# Patient Record
Sex: MALE | Race: BLACK OR AFRICAN AMERICAN | NOT HISPANIC OR LATINO | Employment: STUDENT | URBAN - METROPOLITAN AREA
[De-identification: names, ages, dates, MRNs, and addresses within clinical notes are randomized per-mention and may not be internally consistent; named-entity substitution may affect disease eponyms.]

---
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
## 2025-01-06 ENCOUNTER — APPOINTMENT (OUTPATIENT)
Dept: HEMATOLOGY/ONCOLOGY | Facility: HOSPITAL | Age: 20
End: 2025-01-06
Payer: COMMERCIAL

## 2025-01-13 ASSESSMENT — ENCOUNTER SYMPTOMS
JOINT SWELLING: 0
FATIGUE: 0
FEVER: 0
SLEEP DISTURBANCE: 0
ARTHRALGIAS: 0
SORE THROAT: 0
VOMITING: 0
HEADACHES: 0
SHORTNESS OF BREATH: 0
ABDOMINAL PAIN: 0
DIZZINESS: 0
CHEST TIGHTNESS: 0
EYE PAIN: 0
DIFFICULTY URINATING: 0
LIGHT-HEADEDNESS: 0
APPETITE CHANGE: 0
ACTIVITY CHANGE: 0
EYE DISCHARGE: 0
BACK PAIN: 0
DIARRHEA: 0
DYSURIA: 0
NAUSEA: 0

## 2025-01-13 NOTE — PROGRESS NOTES
Patient ID: Lana Boss is a 18 y.o. male.  Referring Physician:   Primary Care Provider: NIKKI Dinero    Date of Service:  12/23/2024  VISIT TYPE:   Sickle Cell Follow Up  ___ Partial Manual Exchange Transfusion Visit        INTERVAL HISTORY:    Lana Boss is accompanied today by his mother. Since Lana Boss's last sickle cell follow up visit on 12/23/24:    ED: 0  Hospitalizations: 0  Illness: 0  Sickle Cell Pain: none  Concerns: none    HEALTH SURVEILLANCE STATUS:   [facility] - last seen on 8/9/24 - UTD  Dr. Murray - Upcoming on 1/15/25  Opthalmology - last seen on: 6/14/23, normal, scheduled 9/23/24, no show; Needs new appt scheduled  Dental - last seen 7/2023 - two teeth extracted,  had an appt for check-up/cleaning on 3/28/2024  which was missed. Needs new appt scheduled at [facility].  Cardiology - Echo on 1/15/25 normal- no PHTN   Last Cardiac MRI - 4/5/23, T2* value of myocardium is 28 ms suggesting no significant myocardial iron overload. UTD   Last liver MRI - 4/5/23, Hepatic MR signal consistent with iron infiltration. Hepatic iron deposition (LIC of  6 milligrams/gram). Mild to moderate in degree. UTD, Due in April 2025  Audiology - Seen on 8/7/24 - UTD  Neurosurgery - Saw Dr. Snow on 9/13/2023 for Headaches with valsalva maneuvers in the setting of Chiari I Malformation- MRI Brain done.  Thoracic, cervical and lumbar MRI, completed - need to set up FUP for review of scans. Last seen on 8/9/24; UTD  Opioid Agreement - no opioids prescribed  Pain Screen (> 8 yrs) - last completed on  8/17/23 - asymptomatic/low risk, has completed 4 screens, no additional screening needed  Immunizations due today:  UTD ( flu (/2024)  Labs due today: post transfusion HBG ID and cbc/diff/retic      MEDICATION ADHERENCE (missed doses within the last 2 weeks)  Amoxcillin - Missed 4-5 days  HU - Missed 4-5 days  Jadenu - Missed 4-5 days  Medication Refills Needed:  MVI and HU to be sent to [facility]    PMH: Lana had an episode of dactylitis in June 2006 and an episode of splenic sequestration in July 2006 and September 2006 requiring transfusion. He received chronic transfusions in 2006 for repeated splenic sequestration and they were discontinued in November 2006. He suffered a stroke in September 2008 and was begun on chronic transfusions at that time for secondary stroke prevention. He underwent splenectomy in January 2008. He has a history of reactive airway disease. 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  Saw Neurosurg on 1/11/17 for evaluation for Chiari malformation - Dr. Snow ordered MRI cervical, thoracic, and lumbar spine to evaluate for any evidence of syrinx during his most recent visit in September 2023. 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     The cerebellar tonsils terminate 7 mm below the level of the foramen magnum, essentially unchanged since the prior MRI. Again, the right cerebellar tonsil terminates slightly lower compared to the left. There is stable crowding at the foramen magnum due to the low lying cerebellar tonsils that is unachnged from previous MRI brain in 2017. The supratentorial ventricles and the 4th ventricle demonstrate no hydrocephalus and are unchanged in size, shape, and configuration including asymmetric mild prominence of the left lateral ventricle compared to the right. Stable regions of T2 hyperintensity within the bilateral cerebral hemispheric white matter, likely representing gliosis.  Lana is fully vaccinated against COVID 19     Surgical History:  Splenectomy In 2008     Social: Lives with mother and stepfather  Currently in the 12th grade at [employer]. Here for last visit with mom. He did not finish school and his completion date will be reset.     Patient reports he is no longer interested in attending [employer], but  would like to become an  or . See note by Katarzyna Lawson, [facility]    Review of Systems   Constitutional:  Negative for activity change, appetite change, fatigue and fever.   HENT:  Negative for congestion, dental problem, ear pain, sneezing and sore throat.    Eyes:  Negative for pain, discharge and visual disturbance.   Respiratory:  Negative for chest tightness and shortness of breath.    Cardiovascular:  Negative for chest pain and leg swelling.   Gastrointestinal:  Positive for constipation (BM every other day and firm at times. He is not taking anything for it). Negative for abdominal pain, diarrhea, nausea and vomiting.   Genitourinary:  Negative for difficulty urinating, dysuria and penile pain.   Musculoskeletal:  Negative for arthralgias, back pain and joint swelling.   Skin:  Negative for rash.   Allergic/Immunologic: Negative for food allergies.   Neurological:  Negative for dizziness, light-headedness and headaches.   Psychiatric/Behavioral:  Negative for sleep disturbance.         Objective:     Visit Vitals  Smoking Status Every Day        Physical Exam  Vitals and nursing note reviewed.   Constitutional:       General: He is not in acute distress.     Appearance: Normal appearance. He is normal weight. He is not ill-appearing.   HENT:      Head: Atraumatic.      Right Ear: External ear normal. There is no impacted cerumen.      Left Ear: External ear normal. There is no impacted cerumen.      Nose: No congestion or rhinorrhea.      Mouth/Throat:      Mouth: Mucous membranes are moist.      Pharynx: No oropharyngeal exudate or posterior oropharyngeal erythema.   Eyes:      General: Scleral icterus (moderate) present.      Pupils: Pupils are equal, round, and reactive to light.   Cardiovascular:      Rate and Rhythm: Normal rate.      Pulses: Normal pulses.      Heart sounds: Murmur heard.      Systolic murmur is present with a grade of 2/6.      No friction rub. No gallop.    Pulmonary:      Effort: Pulmonary effort is normal. No respiratory distress.      Breath sounds: Normal breath sounds. No stridor. No wheezing, rhonchi or rales.   Chest:      Chest wall: No tenderness.   Abdominal:      General: Bowel sounds are normal. There is no distension.      Palpations: Abdomen is soft. There is no mass.      Tenderness: There is no abdominal tenderness. There is no right CVA tenderness, left CVA tenderness, guarding or rebound.      Hernia: No hernia is present.   Musculoskeletal:         General: No swelling or tenderness. Normal range of motion.      Cervical back: Normal range of motion. No tenderness.   Skin:     General: Skin is warm.      Capillary Refill: Capillary refill takes less than 2 seconds.   Neurological:      General: No focal deficit present.      Mental Status: He is alert.   Psychiatric:      Comments: Baseline           LABORATORY:    Latest Reference Range & Units 01/17/25 15:29   ANTIBODY SCREEN  NEG   ABO TYPE  O   Rh Type  NEG   GLUCOSE 74 - 99 mg/dL 86   SODIUM 136 - 145 mmol/L 141   POTASSIUM 3.5 - 5.3 mmol/L 3.9   CHLORIDE 98 - 107 mmol/L 106   Bicarbonate 21 - 32 mmol/L 28   Anion Gap 10 - 20 mmol/L 11   Blood Urea Nitrogen 6 - 23 mg/dL 10   Creatinine 0.50 - 1.30 mg/dL 0.72   EGFR >60 mL/min/1.73m*2 >90   Calcium 8.6 - 10.6 mg/dL 9.3   Albumin 3.4 - 5.0 g/dL 4.1   Alkaline Phosphatase 33 - 120 U/L 80   ALT 10 - 52 U/L 37   AST 9 - 39 U/L 31   Bilirubin Total 0.0 - 1.2 mg/dL 12.8 (H)   FERRITIN 20 - 300 ng/mL 3,612 (H)   Total Protein 6.4 - 8.2 g/dL 6.8   LDH 84 - 246 U/L 204   WBC 4.4 - 11.3 x10*3/uL 13.9 (H)   nRBC 0.0 - 0.0 /100 WBCs 1.5 (H)   RBC 4.50 - 5.90 x10*6/uL 2.90 (L)   HEMOGLOBIN 13.5 - 17.5 g/dL 8.6 (L)   HEMATOCRIT 41.0 - 52.0 % 24.8 (L)   MCV 80 - 100 fL 86   MCH 26.0 - 34.0 pg 29.7   MCHC 32.0 - 36.0 g/dL 34.7   RED CELL DISTRIBUTION WIDTH 11.5 - 14.5 % 17.7 (H)   Platelets 150 - 450 x10*3/uL 519 (H)   Neutrophils % 40.0 - 80.0 % 53.0    Immature Granulocytes %, Automated 0.0 - 0.9 % 0.5   Lymphocytes % 13.0 - 44.0 % 32.6   Monocytes % 2.0 - 10.0 % 10.2   Eosinophils % 0.0 - 6.0 % 2.8   Basophils % 0.0 - 2.0 % 0.9   Neutrophils Absolute 1.20 - 7.70 x10*3/uL 7.33   Immature Granulocytes Absolute, Automated 0.00 - 0.70 x10*3/uL 0.07   Lymphocytes Absolute 1.20 - 4.80 x10*3/uL 4.52   Monocytes Absolute 0.10 - 1.00 x10*3/uL 1.41 (H)   Eosinophils Absolute 0.00 - 0.70 x10*3/uL 0.39   Basophils Absolute 0.00 - 0.10 x10*3/uL 0.13 (H)   Hemoglobin A  COMMENT ONLY (P)   Hemoglobin F  COMMENT ONLY (P)   Hemoglobin S <=0.0 % 34.5 (H) (P)   Hemoglobin A2  COMMENT ONLY (P)   Hemoglobin Identification Interpretation  COMMENT ONLY (P)   Pathologist Review-Hemoglobin Identification  COMMENT ONLY (P)   Immature Retic fraction <=16.0 % 29.1 (H)   Retic Absolute 0.022 - 0.118 x10*6/uL 0.398 (H)   Retic % 0.5 - 2.0 % 13.7 (H)   Reticulocyte Hemoglobin 28 - 38 pg 34   (H): Data is abnormally high  (L): Data is abnormally low  (P): Preliminary       Latest Reference Range & Units 01/17/25 15:29   WBC 4.4 - 11.3 x10*3/uL 13.9 (H)   nRBC 0.0 - 0.0 /100 WBCs 1.5 (H)   RBC 4.50 - 5.90 x10*6/uL 2.90 (L)   HEMOGLOBIN 13.5 - 17.5 g/dL 8.6 (L)   HEMATOCRIT 41.0 - 52.0 % 24.8 (L)   MCV 80 - 100 fL 86   MCH 26.0 - 34.0 pg 29.7   MCHC 32.0 - 36.0 g/dL 34.7   RED CELL DISTRIBUTION WIDTH 11.5 - 14.5 % 17.7 (H)   Platelets 150 - 450 x10*3/uL 519 (H)   Neutrophils % 40.0 - 80.0 % 53.0   Immature Granulocytes %, Automated 0.0 - 0.9 % 0.5   Lymphocytes % 13.0 - 44.0 % 32.6   Monocytes % 2.0 - 10.0 % 10.2   Eosinophils % 0.0 - 6.0 % 2.8   Basophils % 0.0 - 2.0 % 0.9   Neutrophils Absolute 1.20 - 7.70 x10*3/uL 7.33   Immature Granulocytes Absolute, Automated 0.00 - 0.70 x10*3/uL 0.07   Lymphocytes Absolute 1.20 - 4.80 x10*3/uL 4.52   Monocytes Absolute 0.10 - 1.00 x10*3/uL 1.41 (H)   Eosinophils Absolute 0.00 - 0.70 x10*3/uL 0.39   Basophils Absolute 0.00 - 0.10 x10*3/uL 0.13 (H)    Hemoglobin A  COMMENT ONLY (P)   Hemoglobin F  COMMENT ONLY (P)   Hemoglobin S <=0.0 % 34.5 (H) (P)   Hemoglobin A2  COMMENT ONLY (P)   Hemoglobin Identification Interpretation  COMMENT ONLY (P)   Pathologist Review-Hemoglobin Identification  COMMENT ONLY (P)   (H): Data is abnormally high  (L): Data is abnormally low  (P): Preliminary          Current Outpatient Medications   Medication Instructions    acetaminophen (TYLENOL) 650 mg, oral, Every 6 hours PRN    amoxicillin (AMOXIL) 250 mg, oral, Every 12 hours scheduled    deferasirox (JADENU) 1,440 mg, oral, Daily, Take 4 tablets (1440mg) by  mouth daily from Mondays to Fridays only. No medication on Saturdays and Sundays    hydroxyurea (HYDREA) 1,500 mg, oral, Daily, Take at the same time  from Monday to Fridays only. 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 Other problems include transfusional iron overload and Chiari malformation with a period where he had headaches. His most recent Echo is normal with no evidence of pulmonary hypertension. Medication adherence remains sub-optimal after altering his medication regimen.    Plan  Secondary stroke prevention  Pre transfusion hemoglobin and Hb S percent were  8.6g/dl and  34.5%. Prior to phlebotomy patient drank 300ml Gatorade for rehydration. No Normal saline was given. He tolerated phlebotomy well without dizziness.  Patient was phlebotomized 300ml, then transfused  600mls  prbc. He was premedicated with Tylenol 650mg. He tolerated the phlebotomy and transfusion without event. Transfusion interval is 4 weeks.    Post hemoglobin ID pending: See  epic for results     Transfusional iron overload  Current Jadenu dose is 1440 mg (4 tabs) 5 days a week M-F.  Lana to call himself for refill. Calling for refills has been continuously discussed with Lana  but met with rebuttals that his mother will schedule for him. Lana has been successful in scheduling appointments via [facility] but apprehensive to schedule if required to speak with someone. Encouraged Lana to call for his Jadenu refill with his mother in the room. Number provided.     Disease-modifying therapy  Continue Hydroxyurea 1500 mg 5 days a week (18mg/kg/week on average) M-F.  Refill sent today     Surgical asplenia  Continue Amoxicillin 250mg PO BID    Meds sent to pharmacy: Hydroxyurea, MVI sent to local pharmacy    Health Surveillance also discussed with Lana and he was able to recall with some prompting  Optho  Dental     Next Transfusion:  [facility] infusion center, date not scheduled yet (would be due Monday, 2/17)    BRYNN Roach, FNP-C

## 2025-01-15 ENCOUNTER — HOSPITAL ENCOUNTER (OUTPATIENT)
Dept: CARDIOLOGY | Facility: HOSPITAL | Age: 20
Discharge: HOME | End: 2025-01-15
Payer: COMMERCIAL

## 2025-01-15 ENCOUNTER — OFFICE VISIT (OUTPATIENT)
Dept: HEMATOLOGY/ONCOLOGY | Facility: HOSPITAL | Age: 20
End: 2025-01-15
Payer: COMMERCIAL

## 2025-01-15 VITALS
TEMPERATURE: 98.1 F | WEIGHT: 128.7 LBS | BODY MASS INDEX: 20.44 KG/M2 | RESPIRATION RATE: 16 BRPM | OXYGEN SATURATION: 99 % | SYSTOLIC BLOOD PRESSURE: 122 MMHG | DIASTOLIC BLOOD PRESSURE: 55 MMHG | HEART RATE: 70 BPM

## 2025-01-15 DIAGNOSIS — Z51.81 ENCOUNTER FOR MONITORING OF HYDROXYUREA THERAPY: ICD-10-CM

## 2025-01-15 DIAGNOSIS — Z92.89 HISTORY OF EXCHANGE TRANSFUSION: ICD-10-CM

## 2025-01-15 DIAGNOSIS — D57.1 SICKLE CELL DISEASE WITHOUT CRISIS (MULTI): ICD-10-CM

## 2025-01-15 DIAGNOSIS — D57.1 SICKLE CELL DISEASE WITHOUT CRISIS (MULTI): Primary | ICD-10-CM

## 2025-01-15 DIAGNOSIS — Z79.64 ENCOUNTER FOR MONITORING OF HYDROXYUREA THERAPY: ICD-10-CM

## 2025-01-15 PROCEDURE — 93306 TTE W/DOPPLER COMPLETE: CPT

## 2025-01-15 PROCEDURE — 93306 TTE W/DOPPLER COMPLETE: CPT | Performed by: INTERNAL MEDICINE

## 2025-01-15 PROCEDURE — 99215 OFFICE O/P EST HI 40 MIN: CPT | Performed by: PEDIATRICS

## 2025-01-15 ASSESSMENT — PAIN SCALES - GENERAL: PAINLEVEL_OUTOF10: 0-NO PAIN

## 2025-01-16 LAB
AORTIC VALVE MEAN GRADIENT: 5 MMHG
AORTIC VALVE PEAK VELOCITY: 1.64 M/S
AV PEAK GRADIENT: 11 MMHG
AVA (PEAK VEL): 3.12 CM2
AVA (VTI): 3.14 CM2
EJECTION FRACTION APICAL 4 CHAMBER: 64.5
EJECTION FRACTION: 62 %
LEFT VENTRICLE INTERNAL DIMENSION DIASTOLE: 4.91 CM (ref 3.5–6)
LEFT VENTRICULAR OUTFLOW TRACT DIAMETER: 2.11 CM
MITRAL VALVE E/A RATIO: 3.37
RIGHT VENTRICLE FREE WALL PEAK S': 14 CM/S
RIGHT VENTRICLE PEAK SYSTOLIC PRESSURE: 21.7 MMHG
TRICUSPID ANNULAR PLANE SYSTOLIC EXCURSION: 1.9 CM

## 2025-01-17 ENCOUNTER — HOSPITAL ENCOUNTER (OUTPATIENT)
Dept: PEDIATRIC HEMATOLOGY/ONCOLOGY | Facility: HOSPITAL | Age: 20
Discharge: HOME | End: 2025-01-17
Payer: COMMERCIAL

## 2025-01-17 DIAGNOSIS — D57.1 SICKLE CELL DISEASE WITHOUT CRISIS (MULTI): ICD-10-CM

## 2025-01-17 LAB
ABO GROUP (TYPE) IN BLOOD: NORMAL
ALBUMIN SERPL BCP-MCNC: 4.1 G/DL (ref 3.4–5)
ALP SERPL-CCNC: 80 U/L (ref 33–120)
ALT SERPL W P-5'-P-CCNC: 37 U/L (ref 10–52)
ANION GAP SERPL CALC-SCNC: 11 MMOL/L (ref 10–20)
ANTIBODY SCREEN: NORMAL
AST SERPL W P-5'-P-CCNC: 31 U/L (ref 9–39)
BASOPHILS # BLD AUTO: 0.13 X10*3/UL (ref 0–0.1)
BASOPHILS NFR BLD AUTO: 0.9 %
BILIRUB SERPL-MCNC: 12.8 MG/DL (ref 0–1.2)
BUN SERPL-MCNC: 10 MG/DL (ref 6–23)
CALCIUM SERPL-MCNC: 9.3 MG/DL (ref 8.6–10.6)
CHLORIDE SERPL-SCNC: 106 MMOL/L (ref 98–107)
CO2 SERPL-SCNC: 28 MMOL/L (ref 21–32)
CREAT SERPL-MCNC: 0.72 MG/DL (ref 0.5–1.3)
EGFRCR SERPLBLD CKD-EPI 2021: >90 ML/MIN/1.73M*2
EOSINOPHIL # BLD AUTO: 0.39 X10*3/UL (ref 0–0.7)
EOSINOPHIL NFR BLD AUTO: 2.8 %
ERYTHROCYTE [DISTWIDTH] IN BLOOD BY AUTOMATED COUNT: 17.7 % (ref 11.5–14.5)
FERRITIN SERPL-MCNC: 3612 NG/ML (ref 20–300)
GLUCOSE SERPL-MCNC: 86 MG/DL (ref 74–99)
HCT VFR BLD AUTO: 24.8 % (ref 41–52)
HGB BLD-MCNC: 8.6 G/DL (ref 13.5–17.5)
HGB RETIC QN: 34 PG (ref 28–38)
IMM GRANULOCYTES # BLD AUTO: 0.07 X10*3/UL (ref 0–0.7)
IMM GRANULOCYTES NFR BLD AUTO: 0.5 % (ref 0–0.9)
IMMATURE RETIC FRACTION: 29.1 %
LDH SERPL L TO P-CCNC: 204 U/L (ref 84–246)
LYMPHOCYTES # BLD AUTO: 4.52 X10*3/UL (ref 1.2–4.8)
LYMPHOCYTES NFR BLD AUTO: 32.6 %
MCH RBC QN AUTO: 29.7 PG (ref 26–34)
MCHC RBC AUTO-ENTMCNC: 34.7 G/DL (ref 32–36)
MCV RBC AUTO: 86 FL (ref 80–100)
MONOCYTES # BLD AUTO: 1.41 X10*3/UL (ref 0.1–1)
MONOCYTES NFR BLD AUTO: 10.2 %
NEUTROPHILS # BLD AUTO: 7.33 X10*3/UL (ref 1.2–7.7)
NEUTROPHILS NFR BLD AUTO: 53 %
NRBC BLD-RTO: 1.5 /100 WBCS (ref 0–0)
PLATELET # BLD AUTO: 519 X10*3/UL (ref 150–450)
POTASSIUM SERPL-SCNC: 3.9 MMOL/L (ref 3.5–5.3)
PROT SERPL-MCNC: 6.8 G/DL (ref 6.4–8.2)
RBC # BLD AUTO: 2.9 X10*6/UL (ref 4.5–5.9)
RETICS #: 0.4 X10*6/UL (ref 0.02–0.12)
RETICS/RBC NFR AUTO: 13.7 % (ref 0.5–2)
RH FACTOR (ANTIGEN D): NORMAL
SODIUM SERPL-SCNC: 141 MMOL/L (ref 136–145)
WBC # BLD AUTO: 13.9 X10*3/UL (ref 4.4–11.3)

## 2025-01-17 PROCEDURE — 84075 ASSAY ALKALINE PHOSPHATASE: CPT | Performed by: PEDIATRICS

## 2025-01-17 PROCEDURE — 36415 COLL VENOUS BLD VENIPUNCTURE: CPT

## 2025-01-17 PROCEDURE — 85025 COMPLETE CBC W/AUTO DIFF WBC: CPT | Performed by: PEDIATRICS

## 2025-01-17 PROCEDURE — 86901 BLOOD TYPING SEROLOGIC RH(D): CPT | Performed by: PEDIATRICS

## 2025-01-17 PROCEDURE — 85045 AUTOMATED RETICULOCYTE COUNT: CPT | Performed by: PEDIATRICS

## 2025-01-17 PROCEDURE — 86920 COMPATIBILITY TEST SPIN: CPT

## 2025-01-17 PROCEDURE — 36415 COLL VENOUS BLD VENIPUNCTURE: CPT | Performed by: PEDIATRICS

## 2025-01-17 PROCEDURE — 83615 LACTATE (LD) (LDH) ENZYME: CPT | Performed by: PEDIATRICS

## 2025-01-17 PROCEDURE — 82728 ASSAY OF FERRITIN: CPT | Performed by: PEDIATRICS

## 2025-01-17 NOTE — ADDENDUM NOTE
Encounter addended by: Ruthann Bhakta MA on: 1/17/2025 4:41 PM   Actions taken: Charge Capture section accepted

## 2025-01-20 ENCOUNTER — HOSPITAL ENCOUNTER (OUTPATIENT)
Dept: PEDIATRIC HEMATOLOGY/ONCOLOGY | Facility: HOSPITAL | Age: 20
Discharge: HOME | End: 2025-01-20
Payer: COMMERCIAL

## 2025-01-20 VITALS
OXYGEN SATURATION: 98 % | DIASTOLIC BLOOD PRESSURE: 69 MMHG | WEIGHT: 132.06 LBS | TEMPERATURE: 98.4 F | BODY MASS INDEX: 21.22 KG/M2 | SYSTOLIC BLOOD PRESSURE: 109 MMHG | RESPIRATION RATE: 18 BRPM | HEIGHT: 66 IN | HEART RATE: 65 BPM

## 2025-01-20 DIAGNOSIS — Z51.89 ENCOUNTER FOR BLOOD TRANSFUSION: ICD-10-CM

## 2025-01-20 DIAGNOSIS — Z51.81 ENCOUNTER FOR MONITORING OF HYDROXYUREA THERAPY: ICD-10-CM

## 2025-01-20 DIAGNOSIS — D57.1 SICKLE CELL DISEASE WITHOUT CRISIS (MULTI): ICD-10-CM

## 2025-01-20 DIAGNOSIS — G93.5 CHIARI MALFORMATION TYPE I (MULTI): ICD-10-CM

## 2025-01-20 DIAGNOSIS — Z79.64 ENCOUNTER FOR MONITORING OF HYDROXYUREA THERAPY: ICD-10-CM

## 2025-01-20 LAB
BASOPHILS # BLD AUTO: 0.08 X10*3/UL (ref 0–0.1)
BASOPHILS NFR BLD AUTO: 0.8 %
BLOOD EXPIRATION DATE: NORMAL
BLOOD EXPIRATION DATE: NORMAL
DISPENSE STATUS: NORMAL
DISPENSE STATUS: NORMAL
EOSINOPHIL # BLD AUTO: 0.28 X10*3/UL (ref 0–0.7)
EOSINOPHIL NFR BLD AUTO: 2.6 %
ERYTHROCYTE [DISTWIDTH] IN BLOOD BY AUTOMATED COUNT: 17.5 % (ref 11.5–14.5)
HCT VFR BLD AUTO: 25.6 % (ref 41–52)
HEMOGLOBIN A2: 2.5 % (ref 2–3.5)
HEMOGLOBIN A2: 2.7 % (ref 2–3.5)
HEMOGLOBIN A: 61.9 % (ref 95.8–98)
HEMOGLOBIN A: 67 % (ref 95.8–98)
HEMOGLOBIN F: 0.7 % (ref 0–2)
HEMOGLOBIN F: 0.9 % (ref 0–2)
HEMOGLOBIN IDENTIFICATION INTERPRETATION: ABNORMAL
HEMOGLOBIN IDENTIFICATION INTERPRETATION: ABNORMAL
HEMOGLOBIN S: 29.8 %
HEMOGLOBIN S: 34.5 %
HGB BLD-MCNC: 9.2 G/DL (ref 13.5–17.5)
IMM GRANULOCYTES # BLD AUTO: 0.05 X10*3/UL (ref 0–0.7)
IMM GRANULOCYTES NFR BLD AUTO: 0.5 % (ref 0–0.9)
LYMPHOCYTES # BLD AUTO: 4.09 X10*3/UL (ref 1.2–4.8)
LYMPHOCYTES NFR BLD AUTO: 38.4 %
MCH RBC QN AUTO: 30.1 PG (ref 26–34)
MCHC RBC AUTO-ENTMCNC: 35.9 G/DL (ref 32–36)
MCV RBC AUTO: 84 FL (ref 80–100)
MONOCYTES # BLD AUTO: 1.46 X10*3/UL (ref 0.1–1)
MONOCYTES NFR BLD AUTO: 13.7 %
NEUTROPHILS # BLD AUTO: 4.68 X10*3/UL (ref 1.2–7.7)
NEUTROPHILS NFR BLD AUTO: 44 %
NRBC BLD-RTO: 2.5 /100 WBCS (ref 0–0)
PATH REVIEW-HGB IDENTIFICATION: ABNORMAL
PATH REVIEW-HGB IDENTIFICATION: ABNORMAL
PLATELET # BLD AUTO: 306 X10*3/UL (ref 150–450)
PRODUCT BLOOD TYPE: 9500
PRODUCT BLOOD TYPE: 9500
PRODUCT CODE: NORMAL
PRODUCT CODE: NORMAL
RBC # BLD AUTO: 3.06 X10*6/UL (ref 4.5–5.9)
UNIT ABO: NORMAL
UNIT ABO: NORMAL
UNIT NUMBER: NORMAL
UNIT NUMBER: NORMAL
UNIT RH: NORMAL
UNIT RH: NORMAL
UNIT VOLUME: 350
UNIT VOLUME: 350
WBC # BLD AUTO: 10.6 X10*3/UL (ref 4.4–11.3)
XM INTEP: NORMAL
XM INTEP: NORMAL

## 2025-01-20 PROCEDURE — 83020 HEMOGLOBIN ELECTROPHORESIS: CPT | Performed by: NURSE PRACTITIONER

## 2025-01-20 PROCEDURE — 99195 PHLEBOTOMY: CPT

## 2025-01-20 PROCEDURE — 2500000001 HC RX 250 WO HCPCS SELF ADMINISTERED DRUGS (ALT 637 FOR MEDICARE OP): Mod: SE | Performed by: NURSE PRACTITIONER

## 2025-01-20 PROCEDURE — P9040 RBC LEUKOREDUCED IRRADIATED: HCPCS

## 2025-01-20 PROCEDURE — 85025 COMPLETE CBC W/AUTO DIFF WBC: CPT | Performed by: NURSE PRACTITIONER

## 2025-01-20 PROCEDURE — 36415 COLL VENOUS BLD VENIPUNCTURE: CPT | Performed by: NURSE PRACTITIONER

## 2025-01-20 PROCEDURE — 36430 TRANSFUSION BLD/BLD COMPNT: CPT

## 2025-01-20 PROCEDURE — 83021 HEMOGLOBIN CHROMOTOGRAPHY: CPT | Performed by: NURSE PRACTITIONER

## 2025-01-20 RX ORDER — LIDOCAINE 40 MG/G
CREAM TOPICAL ONCE AS NEEDED
OUTPATIENT
Start: 2025-02-14 | End: 2026-02-14

## 2025-01-20 RX ORDER — ACETAMINOPHEN 325 MG/1
650 TABLET ORAL ONCE
Status: COMPLETED | OUTPATIENT
Start: 2025-01-21 | End: 2025-01-20

## 2025-01-20 RX ORDER — ALBUTEROL SULFATE 0.83 MG/ML
2.5 SOLUTION RESPIRATORY (INHALATION) ONCE AS NEEDED
Status: CANCELLED | OUTPATIENT
Start: 2025-01-23

## 2025-01-20 RX ORDER — HYDROXYUREA 500 MG/1
1500 CAPSULE ORAL DAILY
Qty: 60 CAPSULE | Refills: 0 | Status: SHIPPED | OUTPATIENT
Start: 2025-01-20 | End: 2025-03-17 | Stop reason: SDUPTHER

## 2025-01-20 RX ORDER — EPINEPHRINE 1 MG/ML
0.5 INJECTION INTRAMUSCULAR; INTRAVENOUS; SUBCUTANEOUS ONCE AS NEEDED
Status: CANCELLED | OUTPATIENT
Start: 2025-01-24

## 2025-01-20 RX ORDER — ACETAMINOPHEN 325 MG/1
650 TABLET ORAL ONCE
Status: CANCELLED | OUTPATIENT
Start: 2025-01-24 | End: 2025-01-24

## 2025-01-20 RX ORDER — DIPHENHYDRAMINE HYDROCHLORIDE 50 MG/ML
50 INJECTION, SOLUTION INTRAMUSCULAR; INTRAVENOUS ONCE AS NEEDED
Status: CANCELLED | OUTPATIENT
Start: 2025-01-23 | End: 2026-01-23

## 2025-01-20 RX ORDER — MULTIVITAMIN WITH FOLIC ACID 400 MCG
1 TABLET ORAL DAILY
Qty: 30 TABLET | Refills: 5 | Status: SHIPPED | OUTPATIENT
Start: 2025-01-20

## 2025-01-20 RX ADMIN — ACETAMINOPHEN 650 MG: 325 TABLET ORAL at 10:09

## 2025-01-20 ASSESSMENT — ENCOUNTER SYMPTOMS
CONSTIPATION: 1
LOSS OF SENSATION IN FEET: 0
DEPRESSION: 0
OCCASIONAL FEELINGS OF UNSTEADINESS: 0

## 2025-01-20 ASSESSMENT — PAIN SCALES - GENERAL: PAINLEVEL_OUTOF10: 0-NO PAIN

## 2025-01-20 NOTE — PATIENT INSTRUCTIONS
"You can reach the [facility] Adult Sickle Cell Team by calling [phone].  Listen closely to the prompts.  First, press option 5 for \"medical questions or concerns\"; then press option 1 for \"sickle cell disease.\"     Please call for fever greater than 101 F,  pallor, lethargy, pain not responsive to home medications or any other questions or concerns.      MyChart:  Please send non-urgent messages only.  Messages are checked during regular business hours, Monday - Friday 8:30-4pm.  It may take up to 2 business days for our team to reply.  If you are sick, have a fever or have sickle cell pain, please call [phone], option 1, and then option 3 to speak to the Triage Nurse or On-call Physician immediately.     Sickle Cell Follow Up:  Your next sickle cell follow up will be in ~ 4 weeks.  Dr. Murray's office will reach out to you to set up the dates for your transfusion labs/doctor visit and your transfusion visit.    Refill sent today:  Vitamins and hydroxyurea have been sent to [facility].     Teaching done today:    "

## 2025-01-20 NOTE — PROGRESS NOTES
PEDIATRIC SICKLE CELL NURSING TREATMENT ASSESSMENT:    INTERVAL HISTORY - since last visit:  Source of information/relationship to patient:  [x] self [] other:   Since you last visit, how have you been doing? Good  Have you had any illnesses or fevers? [x] no [] yes:  Have you had any sick contacts with others?  If yes, please explain.  [x] no [] yes:   Have you had any visits to the Emergency Room?  [x] no [] yes:   Have you had any recent hospitalizations?  [x] no [] yes:  Do you have any concerns today?  [x] no [] yes:     Have you had any pain since your last visit?  [x] no [] yes  If yes, how many episodes?  ____  Where was the pain located?    How did you treat the pain?  Did the pain treatment help?  [] yes [] no:  How long did the pain last?  ____ days    REVIEW OF SYSTEMS:  GENERAL:    Abnl activity level [] (Y):  Fatigue/ Malaise [] (Y):    Unusual wt changes    [] (Y):    Abnl appetite          [] (Y):      School absences [] (Y):        Fevers/ Chills   [] (Y):  Pain     [] (Y):    Review of systems is negative except as noted by yes:  [x]    HEENT:   Glasses/contacts [] (Y):    Vision Changes  double or blurred  [] (Y):  Sore throat   [] (Y):    Sneezing/stuffy nose [] (Y):    Snoring  [x] (Y): Mom says he snores a little  Review of systems is negative except as noted by yes:  [x]    RESPIRATORY:  Cough         [] (Y):                       Congestion   [] (Y):      Dyspnea     [] (Y):   Review of systems is negative except as noted by yes:  [x]                                         CV:  Chest Pain   [] (Y):  Exercise Intol   [] (Y):    CVA issues  [] (Y):  Review of systems is negative except as noted by yes:  [x]     GI:  Nausea  [] (Y):  Vomiting   [] (Y):              Diarrhea  [] (Y):   Constipation    [] (Y):       Abdominal pain  [] (Y):  Pica   [] (Y):     Review of systems is negative except as noted by yes:  [x]                      :   Dysuria   [] (Y):  Enuresis  [] (Y):                                      Hematuria  [] (Y):    Priapism  [] (Y):                          LMP(date)  [] (Y):  Irregular cycles [] (Y):    Heavy Bleeding [] (Y):    Birth Control  [] (Y) Type:         Date last taken or given:   Review of systems is negative except as noted by yes:  [x]    ALLERGIC/IMMUNO:   Drug?  seasonal allergies [] (Y):  Review of systems is negative except as noted by yes:  [x]    HEME/LYMPH:   Enlarged spleen [] (Y):   Review of systems is negative except as noted by yes:  [x]            SKIN:    Rash:      [] (Y):   Review of systems is negative except as noted by yes:  [x]                                  MSC-SKL:   Joint stiffness or pain [] (Y):   Review of systems is negative except as noted by yes:  [x]                                  CNS:   Headache  [] (Y):    Dizziness  [] (Y):  Abnl gait  [] (Y):        Numbness/tingling  [] (Y):    Review of systems is negative except as noted by yes:  [x]    PSYCH:   Mood/behavior:  [] (Y):    Sleep    [] (Y):    Substance use  [] (Y):    School/work  [] (Y):  Difficulties  [] (Y):    Review of systems is negative except as noted by yes:  [x]    FOLLOW UP NEEDS:  Medication refills: MVI, HU  Referrals:   Testing appointments:

## 2025-01-20 NOTE — ADDENDUM NOTE
Encounter addended by: Nesha Frost RN on: 1/19/2025 7:38 PM   Actions taken: Charge Capture section accepted

## 2025-01-21 ENCOUNTER — APPOINTMENT (OUTPATIENT)
Dept: PEDIATRIC HEMATOLOGY/ONCOLOGY | Facility: HOSPITAL | Age: 20
End: 2025-01-21
Payer: COMMERCIAL

## 2025-01-21 PROCEDURE — 2500000001 HC RX 250 WO HCPCS SELF ADMINISTERED DRUGS (ALT 637 FOR MEDICARE OP): Mod: SE | Performed by: NURSE PRACTITIONER

## 2025-02-01 ASSESSMENT — ENCOUNTER SYMPTOMS
COUGH: 0
SCLERAL ICTERUS: 1
NUMBNESS: 0
HEMOPTYSIS: 0
DYSURIA: 0
CHEST TIGHTNESS: 0
LEG SWELLING: 0
DIZZINESS: 0
ARTHRALGIAS: 0
NERVOUS/ANXIOUS: 0
VOMITING: 0
SHORTNESS OF BREATH: 0
DIARRHEA: 0
BACK PAIN: 0
FLANK PAIN: 0
WHEEZING: 0
FEVER: 0
MYALGIAS: 0
FATIGUE: 0
SORE THROAT: 0
DEPRESSION: 0
FREQUENCY: 0
CONSTIPATION: 0
EYE PROBLEMS: 0
NAUSEA: 0
HEADACHES: 0
HEMATURIA: 0
LIGHT-HEADEDNESS: 0
EXTREMITY WEAKNESS: 0
WOUND: 0
BRUISES/BLEEDS EASILY: 0
ABDOMINAL PAIN: 0
PALPITATIONS: 0

## 2025-02-02 NOTE — PROGRESS NOTES
SICKLE CELL OUTPATIENT NOTE  Patient ID: Lana Boss   Visit Type: Follow up visit     ASSESSMENT AND PLAN  Lana Boss is 19 y.o. male with     History of Hb SS sickle cell disease without acute pain, or acute complications of sickle cell disease.  Infrequent sickle cell pain has been controlled with Tylenol or ibuprofen as needed and patient is opioid naïve.  Encounter for management of disease modifying therapy, with chronic red blood cell exchange transfusion indication being history of stroke (in 2018) and CNS vasculopathy  Encounter for management of disease modifying therapy with oral hydroxyurea. 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  Future Red cell exchange transfusion will be performed here at [facility].  Target hemoglobin S level posttransfusion will be less than 30%, and post transfusion hemoglobin of around 9 to 10 g/dL.  Discussed pre transfusion labs that needs to be done 24 to 48 hours prior to procedure with a type and screen, hemoglobin identification, ferritin, CBC, and a CMP.  -Continue hydroxyurea 1500 mg daily Monday to Friday.  -We will manage pain with Tylenol 650 mg every 6 hours as needed and or ibuprofen 600 mg every 8 hours as needed for mild to moderate pain.  If this does not control his pain he will call for oral morphine or oxycodone  -Discussed nonpharmacologic methods of pain control  -Patient does not have a record in [facility]  -Continue daily folic acid 1 mg  -Amoxicillin 250 mg twice a day for functional asplenia  -Continue Jadenu 144 0 mg daily  -Follow up will be in 4 weeks time for his next blood cell exchange  transfusion      Chief Complaint: Establish care for hemoglobin SS sickle cell disease     Interval History: Lana is present with his father for follow-up of hemoglobin SS sickle cell disease.  Electronic records were reviewed.  Lana and his father were not very good historians.    Lana is a 19 -year-old -American male with a history of hemoglobin SS sickle cell disease.  He received his care at [facility].  He is without any complaints in clinic today, and denies acute sickle cell pain, headaches, focal neurologic deficits, priapism, blurry vision, double vision, nausea, or vomiting.  He has not had any admissions to hospital or emergency department visits for well over 2 to 3 years now.  His appetite and energy levels have been good.  He denies swelling or leg ulcers.    Review of System:   Review of Systems   Constitutional:  Negative for fatigue and fever.   HENT:   Negative for nosebleeds, sore throat and tinnitus.    Eyes:  Positive for icterus. Negative for eye problems.   Respiratory:  Negative for chest tightness, cough, hemoptysis, shortness of breath and wheezing.    Cardiovascular:  Negative for chest pain, leg swelling and palpitations.   Gastrointestinal:  Negative for abdominal pain, constipation, diarrhea, nausea and vomiting.   Genitourinary:  Negative for dysuria, frequency and hematuria.    Musculoskeletal:  Negative for arthralgias, back pain, flank pain and myalgias.   Skin:  Negative for itching, rash and wound.   Neurological:  Negative for dizziness, extremity weakness, headaches, light-headedness and numbness.   Hematological:  Does not bruise/bleed easily.   Psychiatric/Behavioral:  Negative for depression. The patient is not nervous/anxious.         Allergies:   Allergies   Allergen Reactions    Kiwi Unknown    Red Blood Cells Unknown     Require medication for blood products- tylenol only       Current Medications:   Outpatient Encounter  Medications as of 1/15/2025   Medication Sig    acetaminophen (Tylenol) 325 mg tablet Take 2 tablets (650 mg) by mouth every 6 hours if needed for mild pain (1 - 3) (Note:  please check temperature 1st and do not take medicine if temperature is 101 F or greater - call sickle cell doctor.).    amoxicillin (Amoxil) 250 mg capsule Take 1 capsule (250 mg) by mouth every 12 hours.    deferasirox (Jadenu) 360 mg tablet Take 4 tablets (1,440 mg) by mouth once daily. Take 4 tablets (1440mg) by  mouth daily from Mondays to Fridays only. No medication on Saturdays and Sundays    ibuprofen (Motrin) capsule Take 2 capsules (400 mg) by mouth every 6 hours if needed (for pain or headache; check temperature 1st and do not take if fever 101 F or higher).    naproxen (Naprosyn) 375 mg tablet Do not give if temperature is at or above 101 degrees fahrenheit    hydroxyurea (Hydrea) 500 mg capsule Take 3 capsules (1,500 mg total) by mouth once daily.  Take at the same time  from Monday to Fridays only. No med on Saturdays and Sundays    Tab-A-Emre 400 mcg tablet Take 1 tablet by mouth once daily.     Past Medical/Surgical History:   - Dactylitis in June 2006   - Multiple Splenic sequestration in July 2006, September 2006, requiring transfusion   - Stroke in September 2008 and was begun on chronic transfusions at that time for secondary stroke prevention.  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 He does not have any smokeless tobacco history on file.  has no history on file for drug use. Lives with his parents. He is still in school  and in 12th grade. He also works at the [employer]       EXAMINATION FINDINGS   /55 (BP Location: Right arm, Patient Position: Sitting)   Pulse 70   Temp 36.7 °C (98.1 °F) (Temporal)   Resp 16   Wt 58.4 kg (128 lb 11.2 oz)   SpO2 99%   BMI 20.44 kg/m²   1.66 meters squared    Physical Exam  Vitals reviewed.   Constitutional:       General: He is not in acute distress.     Appearance: He is normal weight. He is not ill-appearing.   HENT:      Head: Normocephalic and atraumatic.      Right Ear: Tympanic membrane and ear canal normal.      Left Ear: Tympanic membrane and ear canal normal.      Nose: Nose normal.      Mouth/Throat:      Mouth: Mucous membranes are moist.      Pharynx: Oropharynx is clear. No oropharyngeal exudate or posterior oropharyngeal erythema.   Eyes:      General: Scleral icterus present.   Cardiovascular:      Rate and Rhythm: Normal rate and regular rhythm.      Pulses: Normal pulses.      Heart sounds: Murmur heard.      No gallop.   Pulmonary:      Effort: Pulmonary effort is normal. No respiratory distress.      Breath sounds: Normal breath sounds. No wheezing or rales.   Abdominal:      General: Bowel sounds are normal. There is no distension.      Palpations: Abdomen is soft.      Tenderness: There is no abdominal tenderness. There is no guarding.   Musculoskeletal:         General: No swelling or deformity. Normal range of motion.      Cervical back: Normal range of motion and neck supple. No rigidity.      Right lower leg: No edema.      Left lower leg: No edema.   Skin:     General: Skin is warm.      Capillary Refill: Capillary refill takes less than 2 seconds.      Coloration: Skin is not jaundiced.      Findings: No bruising.   Neurological:      General: No focal deficit present.      Mental Status: He is alert and oriented to person, place, and time. Mental status is at baseline.      Cranial Nerves: No cranial nerve deficit.   Psychiatric:         Mood and Affect:  Mood normal.         Behavior: Behavior normal.       LABS   Hospital Outpatient Visit on 01/15/2025   Component Date Value Ref Range Status    AV pk dario 01/15/2025 1.64  m/s Final    AV mn grad 01/15/2025 5  mmHg Final    LVOT diam 01/15/2025 2.11  cm Final    MV E/A ratio 01/15/2025 3.37   Final    Tricuspid annular plane systolic e* 01/15/2025 1.9  cm Final    LV EF 01/15/2025 62  % Final    RV free wall pk S' 01/15/2025 14.00  cm/s Final    LVIDd 01/15/2025 4.91  cm Final    RVSP 01/15/2025 21.7  mmHg Final    Aortic Valve Area by Continuity of* 01/15/2025 3.14  cm2 Final    Aortic Valve Area by Continuity of* 01/15/2025 3.12  cm2 Final    AV pk grad 01/15/2025 11  mmHg Final    LV A4C EF 01/15/2025 64.5   Final          Brett Murray MD

## 2025-02-03 DIAGNOSIS — D57.09 SICKLE CELL DISEASE WITH CRISIS AND OTHER COMPLICATION: ICD-10-CM

## 2025-02-03 DIAGNOSIS — Z51.89 ENCOUNTER FOR BLOOD TRANSFUSION: ICD-10-CM

## 2025-02-03 DIAGNOSIS — D57.00 SICKLE CELL DISEASE WITH CRISIS (MULTI): Primary | ICD-10-CM

## 2025-02-04 RX ORDER — EPINEPHRINE 0.3 MG/.3ML
0.3 INJECTION SUBCUTANEOUS EVERY 5 MIN PRN
Status: CANCELLED | OUTPATIENT
Start: 2025-02-17

## 2025-02-04 RX ORDER — DIPHENHYDRAMINE HCL 25 MG
25 CAPSULE ORAL ONCE
OUTPATIENT
Start: 2025-02-17

## 2025-02-04 RX ORDER — FAMOTIDINE 10 MG/ML
20 INJECTION INTRAVENOUS ONCE AS NEEDED
Status: CANCELLED | OUTPATIENT
Start: 2025-02-17

## 2025-02-04 RX ORDER — ACETAMINOPHEN 325 MG/1
650 TABLET ORAL ONCE
Status: CANCELLED | OUTPATIENT
Start: 2025-02-17

## 2025-02-04 RX ORDER — DIPHENHYDRAMINE HCL 25 MG
25 CAPSULE ORAL ONCE
Status: CANCELLED | OUTPATIENT
Start: 2025-02-17

## 2025-02-04 RX ORDER — ALBUTEROL SULFATE 0.83 MG/ML
3 SOLUTION RESPIRATORY (INHALATION) AS NEEDED
Status: CANCELLED | OUTPATIENT
Start: 2025-02-17

## 2025-02-04 RX ORDER — ACETAMINOPHEN 325 MG/1
650 TABLET ORAL ONCE
OUTPATIENT
Start: 2025-02-17

## 2025-02-04 RX ORDER — FAMOTIDINE 10 MG/ML
20 INJECTION INTRAVENOUS ONCE AS NEEDED
OUTPATIENT
Start: 2025-02-17

## 2025-02-04 RX ORDER — ALBUTEROL SULFATE 0.83 MG/ML
3 SOLUTION RESPIRATORY (INHALATION) AS NEEDED
OUTPATIENT
Start: 2025-02-17

## 2025-02-04 RX ORDER — DIPHENHYDRAMINE HYDROCHLORIDE 50 MG/ML
50 INJECTION INTRAMUSCULAR; INTRAVENOUS AS NEEDED
Status: CANCELLED | OUTPATIENT
Start: 2025-02-17

## 2025-02-04 RX ORDER — EPINEPHRINE 0.3 MG/.3ML
0.3 INJECTION SUBCUTANEOUS EVERY 5 MIN PRN
OUTPATIENT
Start: 2025-02-17

## 2025-02-04 RX ORDER — DIPHENHYDRAMINE HYDROCHLORIDE 50 MG/ML
50 INJECTION INTRAMUSCULAR; INTRAVENOUS AS NEEDED
OUTPATIENT
Start: 2025-02-17

## 2025-02-14 ENCOUNTER — TELEPHONE (OUTPATIENT)
Dept: HEMATOLOGY/ONCOLOGY | Facility: HOSPITAL | Age: 20
End: 2025-02-14
Payer: COMMERCIAL

## 2025-02-14 NOTE — TELEPHONE ENCOUNTER
Spoke with patients mother, Mara, regarding labs for upcoming appointment. Mara stated that she was unaware pt had to get them done today, thought she could get them done on Monday. Educated her on why we need them drawn prior to appointment day. States she is unable to bring Lana to the lab today, but will be able to bring him to [place] lab tomorrow. Aware hours are from 8-12.

## 2025-02-16 ENCOUNTER — TELEPHONE (OUTPATIENT)
Dept: HEMATOLOGY/ONCOLOGY | Facility: HOSPITAL | Age: 20
End: 2025-02-16
Payer: COMMERCIAL

## 2025-02-17 ENCOUNTER — APPOINTMENT (OUTPATIENT)
Dept: HEMATOLOGY/ONCOLOGY | Facility: HOSPITAL | Age: 20
End: 2025-02-17
Payer: COMMERCIAL

## 2025-02-17 ENCOUNTER — APPOINTMENT (OUTPATIENT)
Dept: PEDIATRIC HEMATOLOGY/ONCOLOGY | Facility: HOSPITAL | Age: 20
End: 2025-02-17
Payer: COMMERCIAL

## 2025-02-28 ENCOUNTER — TELEPHONE (OUTPATIENT)
Dept: HEMATOLOGY/ONCOLOGY | Facility: HOSPITAL | Age: 20
End: 2025-02-28
Payer: COMMERCIAL

## 2025-02-28 NOTE — TELEPHONE ENCOUNTER
Pt is scheduled for modified exchange on Mon 3/3. No type and screen done as of 2/28 1715. Attempted to call both patient numbers listed and mother's contact with no answer. If patient does not get labs done at [facility] tomorrow AM, he will need exchange rescheduled. Charge RN to follow up on Monday AM and team notified.

## 2025-03-03 ENCOUNTER — TELEPHONE (OUTPATIENT)
Dept: ADMISSION | Facility: HOSPITAL | Age: 20
End: 2025-03-03
Payer: COMMERCIAL

## 2025-03-03 ENCOUNTER — APPOINTMENT (OUTPATIENT)
Dept: HEMATOLOGY/ONCOLOGY | Facility: HOSPITAL | Age: 20
End: 2025-03-03
Payer: COMMERCIAL

## 2025-03-03 NOTE — TELEPHONE ENCOUNTER
"Pt's mother questioning why infusion was cancelled for today- she said this is the 2nd time infusion has been cancelled.   Pt was to be transitioning to the adult side of sickle cell team.   Pt's mother is very upset and feels she is \"getting the run a round\"     "

## 2025-03-04 ENCOUNTER — LAB (OUTPATIENT)
Dept: LAB | Facility: HOSPITAL | Age: 20
End: 2025-03-04
Payer: COMMERCIAL

## 2025-03-13 ENCOUNTER — TELEPHONE (OUTPATIENT)
Dept: HEMATOLOGY/ONCOLOGY | Facility: HOSPITAL | Age: 20
End: 2025-03-13
Payer: COMMERCIAL

## 2025-03-13 NOTE — TELEPHONE ENCOUNTER
Appointment reminder made to pt. No answer VM left. Pt to come to [facility] on Friday 3/14 for labs only. Follow up visit with Dr. Murray at 130 with Modified partial exchange @ 2pm.

## 2025-03-14 ENCOUNTER — LAB (OUTPATIENT)
Dept: LAB | Facility: HOSPITAL | Age: 20
End: 2025-03-14
Payer: COMMERCIAL

## 2025-03-14 DIAGNOSIS — D57.00 SICKLE CELL DISEASE WITH CRISIS (MULTI): ICD-10-CM

## 2025-03-14 DIAGNOSIS — D57.09 SICKLE CELL DISEASE WITH CRISIS AND OTHER COMPLICATION: ICD-10-CM

## 2025-03-14 DIAGNOSIS — Z51.89 ENCOUNTER FOR BLOOD TRANSFUSION: ICD-10-CM

## 2025-03-14 LAB
ABO GROUP (TYPE) IN BLOOD: NORMAL
ANTIBODY SCREEN: NORMAL
BASOPHILS # BLD AUTO: 0.07 X10*3/UL (ref 0–0.1)
BASOPHILS NFR BLD AUTO: 0.5 %
EOSINOPHIL # BLD AUTO: 0.45 X10*3/UL (ref 0–0.7)
EOSINOPHIL NFR BLD AUTO: 3.4 %
ERYTHROCYTE [DISTWIDTH] IN BLOOD BY AUTOMATED COUNT: 22.5 % (ref 11.5–14.5)
FERRITIN SERPL-MCNC: 4460 NG/ML (ref 20–300)
HCT VFR BLD AUTO: 23.6 % (ref 41–52)
HGB BLD-MCNC: 8.2 G/DL (ref 13.5–17.5)
HGB RETIC QN: 35 PG (ref 28–38)
HOWELL-JOLLY BOD BLD QL SMEAR: PRESENT
IMM GRANULOCYTES # BLD AUTO: 0.13 X10*3/UL (ref 0–0.7)
IMM GRANULOCYTES NFR BLD AUTO: 1 % (ref 0–0.9)
IMMATURE RETIC FRACTION: 35.9 %
LDH SERPL L TO P-CCNC: 316 U/L (ref 84–246)
LYMPHOCYTES # BLD AUTO: 3.98 X10*3/UL (ref 1.2–4.8)
LYMPHOCYTES NFR BLD AUTO: 30.3 %
MCH RBC QN AUTO: 30.9 PG (ref 26–34)
MCHC RBC AUTO-ENTMCNC: 34.7 G/DL (ref 32–36)
MCV RBC AUTO: 89 FL (ref 80–100)
MONOCYTES # BLD AUTO: 1.68 X10*3/UL (ref 0.1–1)
MONOCYTES NFR BLD AUTO: 12.8 %
NEUTROPHILS # BLD AUTO: 6.83 X10*3/UL (ref 1.2–7.7)
NEUTROPHILS NFR BLD AUTO: 52 %
NRBC BLD-RTO: 1.5 /100 WBCS (ref 0–0)
OVALOCYTES BLD QL SMEAR: NORMAL
PAPPENHEIMER BOD BLD QL SMEAR: PRESENT
PLATELET # BLD AUTO: 468 X10*3/UL (ref 150–450)
POLYCHROMASIA BLD QL SMEAR: NORMAL
RBC # BLD AUTO: 2.65 X10*6/UL (ref 4.5–5.9)
RBC MORPH BLD: NORMAL
RETICS #: 0.72 X10*6/UL (ref 0.02–0.12)
RETICS/RBC NFR AUTO: 27.4 % (ref 0.5–2)
RH FACTOR (ANTIGEN D): NORMAL
SCHISTOCYTES BLD QL SMEAR: NORMAL
SICKLE CELLS BLD QL SMEAR: NORMAL
TARGETS BLD QL SMEAR: NORMAL
WBC # BLD AUTO: 13.1 X10*3/UL (ref 4.4–11.3)

## 2025-03-14 PROCEDURE — 36415 COLL VENOUS BLD VENIPUNCTURE: CPT

## 2025-03-14 PROCEDURE — 85045 AUTOMATED RETICULOCYTE COUNT: CPT

## 2025-03-14 PROCEDURE — 83021 HEMOGLOBIN CHROMOTOGRAPHY: CPT

## 2025-03-14 PROCEDURE — 86920 COMPATIBILITY TEST SPIN: CPT

## 2025-03-14 PROCEDURE — 83615 LACTATE (LD) (LDH) ENZYME: CPT

## 2025-03-14 PROCEDURE — 86901 BLOOD TYPING SEROLOGIC RH(D): CPT

## 2025-03-14 PROCEDURE — 83020 HEMOGLOBIN ELECTROPHORESIS: CPT | Performed by: PATHOLOGY

## 2025-03-14 PROCEDURE — 82728 ASSAY OF FERRITIN: CPT

## 2025-03-14 PROCEDURE — 85025 COMPLETE CBC W/AUTO DIFF WBC: CPT

## 2025-03-16 RX ORDER — ALBUTEROL SULFATE 0.83 MG/ML
3 SOLUTION RESPIRATORY (INHALATION) AS NEEDED
Status: CANCELLED | OUTPATIENT
Start: 2025-04-11

## 2025-03-16 RX ORDER — FAMOTIDINE 10 MG/ML
20 INJECTION, SOLUTION INTRAVENOUS ONCE AS NEEDED
Status: CANCELLED | OUTPATIENT
Start: 2025-04-11

## 2025-03-16 RX ORDER — DIPHENHYDRAMINE HCL 25 MG
25 CAPSULE ORAL ONCE
Status: CANCELLED | OUTPATIENT
Start: 2025-04-11

## 2025-03-16 RX ORDER — DIPHENHYDRAMINE HYDROCHLORIDE 50 MG/ML
50 INJECTION, SOLUTION INTRAMUSCULAR; INTRAVENOUS AS NEEDED
Status: CANCELLED | OUTPATIENT
Start: 2025-04-11

## 2025-03-16 RX ORDER — EPINEPHRINE 0.3 MG/.3ML
0.3 INJECTION SUBCUTANEOUS EVERY 5 MIN PRN
Status: CANCELLED | OUTPATIENT
Start: 2025-04-11

## 2025-03-16 RX ORDER — ACETAMINOPHEN 325 MG/1
650 TABLET ORAL ONCE
Status: CANCELLED | OUTPATIENT
Start: 2025-04-11

## 2025-03-17 ENCOUNTER — INFUSION (OUTPATIENT)
Dept: HEMATOLOGY/ONCOLOGY | Facility: HOSPITAL | Age: 20
End: 2025-03-17
Payer: COMMERCIAL

## 2025-03-17 ENCOUNTER — OFFICE VISIT (OUTPATIENT)
Dept: HEMATOLOGY/ONCOLOGY | Facility: HOSPITAL | Age: 20
End: 2025-03-17
Payer: COMMERCIAL

## 2025-03-17 VITALS
OXYGEN SATURATION: 96 % | TEMPERATURE: 98.6 F | DIASTOLIC BLOOD PRESSURE: 78 MMHG | RESPIRATION RATE: 16 BRPM | HEART RATE: 95 BPM | SYSTOLIC BLOOD PRESSURE: 120 MMHG

## 2025-03-17 VITALS
RESPIRATION RATE: 16 BRPM | WEIGHT: 126.6 LBS | BODY MASS INDEX: 20.2 KG/M2 | SYSTOLIC BLOOD PRESSURE: 151 MMHG | HEART RATE: 105 BPM | OXYGEN SATURATION: 95 % | DIASTOLIC BLOOD PRESSURE: 72 MMHG | TEMPERATURE: 96.3 F

## 2025-03-17 DIAGNOSIS — D57.1 SICKLE CELL DISEASE WITHOUT CRISIS (MULTI): ICD-10-CM

## 2025-03-17 DIAGNOSIS — Z51.89 ENCOUNTER FOR BLOOD TRANSFUSION: ICD-10-CM

## 2025-03-17 DIAGNOSIS — Z51.81 ENCOUNTER FOR MONITORING OF HYDROXYUREA THERAPY: ICD-10-CM

## 2025-03-17 DIAGNOSIS — E83.19 IRON OVERLOAD: Primary | ICD-10-CM

## 2025-03-17 DIAGNOSIS — Z92.89 HISTORY OF EXCHANGE TRANSFUSION: ICD-10-CM

## 2025-03-17 DIAGNOSIS — D57.09 SICKLE CELL DISEASE WITH CRISIS AND OTHER COMPLICATION: ICD-10-CM

## 2025-03-17 DIAGNOSIS — Z79.64 ENCOUNTER FOR MONITORING OF HYDROXYUREA THERAPY: ICD-10-CM

## 2025-03-17 LAB
BASOPHILS # BLD AUTO: 0.13 X10*3/UL (ref 0–0.1)
BASOPHILS NFR BLD AUTO: 0.9 %
BLOOD EXPIRATION DATE: NORMAL
DISPENSE STATUS: NORMAL
EOSINOPHIL # BLD AUTO: 0.26 X10*3/UL (ref 0–0.7)
EOSINOPHIL NFR BLD AUTO: 1.7 %
ERYTHROCYTE [DISTWIDTH] IN BLOOD BY AUTOMATED COUNT: 18.4 % (ref 11.5–14.5)
HCT VFR BLD AUTO: 30.1 % (ref 41–52)
HGB BLD-MCNC: 10.4 G/DL (ref 13.5–17.5)
IMM GRANULOCYTES # BLD AUTO: 0.12 X10*3/UL (ref 0–0.7)
IMM GRANULOCYTES NFR BLD AUTO: 0.8 % (ref 0–0.9)
LYMPHOCYTES # BLD AUTO: 4.02 X10*3/UL (ref 1.2–4.8)
LYMPHOCYTES NFR BLD AUTO: 27 %
MCH RBC QN AUTO: 30.9 PG (ref 26–34)
MCHC RBC AUTO-ENTMCNC: 34.6 G/DL (ref 32–36)
MCV RBC AUTO: 89 FL (ref 80–100)
MONOCYTES # BLD AUTO: 2.11 X10*3/UL (ref 0.1–1)
MONOCYTES NFR BLD AUTO: 14.2 %
NEUTROPHILS # BLD AUTO: 8.27 X10*3/UL (ref 1.2–7.7)
NEUTROPHILS NFR BLD AUTO: 55.4 %
NRBC BLD-RTO: 6.2 /100 WBCS (ref 0–0)
PLATELET # BLD AUTO: 366 X10*3/UL (ref 150–450)
PRODUCT BLOOD TYPE: 9500
PRODUCT CODE: NORMAL
RBC # BLD AUTO: 3.37 X10*6/UL (ref 4.5–5.9)
UNIT ABO: NORMAL
UNIT NUMBER: NORMAL
UNIT RH: NORMAL
UNIT VOLUME: 274
UNIT VOLUME: 350
UNIT VOLUME: 350
WBC # BLD AUTO: 14.9 X10*3/UL (ref 4.4–11.3)
XM INTEP: NORMAL

## 2025-03-17 PROCEDURE — 99215 OFFICE O/P EST HI 40 MIN: CPT | Performed by: PEDIATRICS

## 2025-03-17 PROCEDURE — 99195 PHLEBOTOMY: CPT

## 2025-03-17 PROCEDURE — 2500000001 HC RX 250 WO HCPCS SELF ADMINISTERED DRUGS (ALT 637 FOR MEDICARE OP): Mod: SE | Performed by: PEDIATRICS

## 2025-03-17 PROCEDURE — 83021 HEMOGLOBIN CHROMOTOGRAPHY: CPT

## 2025-03-17 PROCEDURE — P9040 RBC LEUKOREDUCED IRRADIATED: HCPCS

## 2025-03-17 PROCEDURE — 85025 COMPLETE CBC W/AUTO DIFF WBC: CPT

## 2025-03-17 PROCEDURE — 36430 TRANSFUSION BLD/BLD COMPNT: CPT

## 2025-03-17 RX ORDER — HYDROXYUREA 500 MG/1
1500 CAPSULE ORAL DAILY
Qty: 60 CAPSULE | Refills: 0 | Status: SHIPPED | OUTPATIENT
Start: 2025-03-17

## 2025-03-17 RX ORDER — FAMOTIDINE 10 MG/ML
20 INJECTION, SOLUTION INTRAVENOUS ONCE AS NEEDED
OUTPATIENT
Start: 2025-04-11

## 2025-03-17 RX ORDER — DIPHENHYDRAMINE HCL 25 MG
25 CAPSULE ORAL ONCE
OUTPATIENT
Start: 2025-04-11

## 2025-03-17 RX ORDER — DIPHENHYDRAMINE HCL 25 MG
25 CAPSULE ORAL ONCE
Status: COMPLETED | OUTPATIENT
Start: 2025-03-17 | End: 2025-03-17

## 2025-03-17 RX ORDER — DEFERASIROX 360 MG/1
1440 TABLET, FILM COATED ORAL DAILY
Qty: 120 TABLET | Refills: 11 | Status: SHIPPED | OUTPATIENT
Start: 2025-03-17 | End: 2025-04-16

## 2025-03-17 RX ORDER — DIPHENHYDRAMINE HYDROCHLORIDE 50 MG/ML
50 INJECTION, SOLUTION INTRAMUSCULAR; INTRAVENOUS AS NEEDED
Status: DISCONTINUED | OUTPATIENT
Start: 2025-03-17 | End: 2025-03-17 | Stop reason: HOSPADM

## 2025-03-17 RX ORDER — FAMOTIDINE 10 MG/ML
20 INJECTION, SOLUTION INTRAVENOUS ONCE AS NEEDED
Status: DISCONTINUED | OUTPATIENT
Start: 2025-03-17 | End: 2025-03-17 | Stop reason: HOSPADM

## 2025-03-17 RX ORDER — EPINEPHRINE 0.3 MG/.3ML
0.3 INJECTION SUBCUTANEOUS EVERY 5 MIN PRN
Status: DISCONTINUED | OUTPATIENT
Start: 2025-03-17 | End: 2025-03-17 | Stop reason: HOSPADM

## 2025-03-17 RX ORDER — ALBUTEROL SULFATE 0.83 MG/ML
3 SOLUTION RESPIRATORY (INHALATION) AS NEEDED
OUTPATIENT
Start: 2025-04-11

## 2025-03-17 RX ORDER — DIPHENHYDRAMINE HYDROCHLORIDE 50 MG/ML
50 INJECTION, SOLUTION INTRAMUSCULAR; INTRAVENOUS AS NEEDED
OUTPATIENT
Start: 2025-04-11

## 2025-03-17 RX ORDER — ALBUTEROL SULFATE 0.83 MG/ML
3 SOLUTION RESPIRATORY (INHALATION) AS NEEDED
Status: DISCONTINUED | OUTPATIENT
Start: 2025-03-17 | End: 2025-03-17 | Stop reason: HOSPADM

## 2025-03-17 RX ORDER — EPINEPHRINE 0.3 MG/.3ML
0.3 INJECTION SUBCUTANEOUS EVERY 5 MIN PRN
OUTPATIENT
Start: 2025-04-11

## 2025-03-17 RX ORDER — ACETAMINOPHEN 325 MG/1
650 TABLET ORAL ONCE
Status: COMPLETED | OUTPATIENT
Start: 2025-03-17 | End: 2025-03-17

## 2025-03-17 RX ORDER — ACETAMINOPHEN 325 MG/1
650 TABLET ORAL ONCE
OUTPATIENT
Start: 2025-04-11

## 2025-03-17 RX ADMIN — DIPHENHYDRAMINE HYDROCHLORIDE 25 MG: 25 CAPSULE ORAL at 15:07

## 2025-03-17 RX ADMIN — ACETAMINOPHEN 650 MG: 325 TABLET ORAL at 15:07

## 2025-03-17 ASSESSMENT — PAIN SCALES - GENERAL: PAINLEVEL_OUTOF10: 0-NO PAIN

## 2025-03-17 NOTE — PROGRESS NOTES
Patient seen in infusion for partial exchange accompanied by mother, tolerated without complications. 500 ml of blood phlebotomized per order. 3 units of pRBCs given per order. Labs and schedule reviewed with patient. IV removed. Discharged ambulatory in stable condition.

## 2025-03-18 LAB
HEMOGLOBIN A2: 2.7 % (ref 2–3.5)
HEMOGLOBIN A2: 3 % (ref 2–3.5)
HEMOGLOBIN A: 32.7 % (ref 95.8–98)
HEMOGLOBIN A: 61.6 % (ref 95.8–98)
HEMOGLOBIN F: 0.8 % (ref 0–2)
HEMOGLOBIN F: 1 % (ref 0–2)
HEMOGLOBIN IDENTIFICATION INTERPRETATION: ABNORMAL
HEMOGLOBIN IDENTIFICATION INTERPRETATION: ABNORMAL
HEMOGLOBIN S: 34.9 %
HEMOGLOBIN S: 63.3 %
PATH REVIEW-HGB IDENTIFICATION: ABNORMAL
PATH REVIEW-HGB IDENTIFICATION: ABNORMAL

## 2025-03-20 ENCOUNTER — TELEPHONE (OUTPATIENT)
Dept: HEMATOLOGY/ONCOLOGY | Facility: HOSPITAL | Age: 20
End: 2025-03-20
Payer: COMMERCIAL

## 2025-03-20 NOTE — TELEPHONE ENCOUNTER
Zacarias from [facility] calls to clarify the prescription they received for Jadenu. Directions are to take 4 tabs once daily. Previous directions were to take 4 tabs once daily except for Saturday and Sunday. Pharmacy is requesting clarification.    Message sent to Sickle Cell team.

## 2025-04-04 ASSESSMENT — ENCOUNTER SYMPTOMS
DEPRESSION: 0
SHORTNESS OF BREATH: 0
WOUND: 0
SORE THROAT: 0
ARTHRALGIAS: 0
ABDOMINAL PAIN: 0
BRUISES/BLEEDS EASILY: 0
LIGHT-HEADEDNESS: 0
DYSURIA: 0
HEADACHES: 0
DIARRHEA: 0
BACK PAIN: 0
LEG SWELLING: 0
CONSTIPATION: 0
NERVOUS/ANXIOUS: 0
FLANK PAIN: 0
CHEST TIGHTNESS: 0
SCLERAL ICTERUS: 1
NUMBNESS: 0
DIZZINESS: 0
MYALGIAS: 0
VOMITING: 0
COUGH: 0
WHEEZING: 0
NAUSEA: 0
FREQUENCY: 0
HEMOPTYSIS: 0
EYE PROBLEMS: 0
HEMATURIA: 0
EXTREMITY WEAKNESS: 0
FEVER: 0
PALPITATIONS: 0
FATIGUE: 0

## 2025-04-04 NOTE — PROGRESS NOTES
SICKLE CELL OUTPATIENT NOTE  Patient ID: Lana Boss   Visit Type: Follow up visit     ASSESSMENT AND PLAN  Lana Boss is 19 y.o. male with     History of Hb SS sickle cell. He is in steady state and without acute complications of SCD including acute pain. 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  -Continue hydroxyurea 1500 mg daily Monday to Friday.  -No changes in his current home oral pain regimen with   Oral Tylenol 650 mg every 6 hours as needed and or ibuprofen 600 mg every 8 hours  or naprosyn 500 mg BID as needed for mild to moderate pain.    If above does not control his pain he will call for oral morphine or oxycodone. Of note, he is opioid naive and does not have any record in [facility]   Nonpharmacologic methods of pain control  -Daily folic acid 1 mg  -Amoxicillin 250 mg twice a day for functional asplenia  -Continue Jadenu 1440 mg daily and emphasized importance of medication compliance   -Follow up will be in 4 weeks time for his next blood cell exchange transfusion    Chief Complaint: HB SS SCD on chronic manual red cell transfusion      Interval History: Lana is present  with his mother in clinic today and they are without any complaints. He missed lasts months red cell exchange transfusion. He denies acute sickle cell pain in clinic today and has not had any since his last office visit encounter. He denies headaches, dizziness, focal neurologic deficits,  priapism, blurry vision, double vision, nausea, or vomiting.  He has not had any admissions to hospital or emergency department visits for the past 3 years. His appetite and energy levels have been good.  He denies swelling or leg ulcers. He is intermittently compliant with jadenu    Review of System:   Review of Systems   Constitutional:  Negative for fatigue and fever.   HENT:   Negative for nosebleeds, sore throat and tinnitus.    Eyes:  Positive for icterus. Negative for eye problems.   Respiratory:  Negative for chest tightness, cough, hemoptysis, shortness of breath and wheezing.    Cardiovascular:  Negative for chest pain, leg swelling and palpitations.   Gastrointestinal:  Negative for abdominal pain, constipation, diarrhea, nausea and vomiting.   Genitourinary:  Negative for dysuria, frequency and hematuria.    Musculoskeletal:  Negative for arthralgias, back pain, flank pain and myalgias.   Skin:  Negative for itching, rash and wound.   Neurological:  Negative for dizziness, extremity weakness, headaches, light-headedness and numbness.   Hematological:  Does not bruise/bleed easily.   Psychiatric/Behavioral:  Negative for depression. The patient is not nervous/anxious.       Allergies:   Allergies   Allergen Reactions    Kiwi Unknown    Red Blood Cells Unknown     Require medication for blood products- tylenol only     Current Medications:      Medication Sig    acetaminophen (Tylenol) 325 mg tablet Take 2 tablets (650 mg) by mouth every 6 hours if needed for mild pain (1 - 3) (Note:  please check temperature 1st and do not take medicine if temperature is 101 F or greater - call sickle cell doctor.).    amoxicillin (Amoxil) 250 mg capsule Take 1 capsule (250 mg) by mouth every 12 hours.    deferasirox (Jadenu) 360 mg tablet Take 4 tablets (1,440 mg) by mouth once daily. Take 4 tablets (1440mg) by  mouth daily from Mondays to Fridays only. No medication on Saturdays and Sundays    ibuprofen (Motrin) capsule  Take 2 capsules (400 mg) by mouth every 6 hours if needed (for pain or headache; check temperature 1st and do not take if fever 101 F or higher).    naproxen (Naprosyn) 375 mg tablet Do not give if temperature is at or above 101 degrees fahrenheit    hydroxyurea (Hydrea) 500 mg capsule Take 3 capsules (1,500 mg total) by mouth once daily.  Take at the same time  from Monday to Fridays only. No med on Saturdays and Sundays    Tab-A-Emre 400 mcg tablet Take 1 tablet by mouth once daily.     Past Medical/Surgical History:   - Dactylitis in June 2006   - Multiple Splenic sequestration in July 2006, September 2006, requiring transfusion   - Stroke in September 2008 and was begun on chronic transfusions at that time for secondary stroke prevention.  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 He does not have any smokeless tobacco history on file.  has no history on file for drug use. Lives with his parents. He graduated high school and looking for a job. He continues to work at the [employer]     EXAMINATION FINDINGS   /72 (BP Location: Right arm, Patient Position: Sitting, BP Cuff Size: Adult)   Pulse 105   Temp 35.7 °C (96.3 °F) (Skin)   Resp 16   Wt 57.4 kg (126 lb 9.6 oz)   SpO2 95%   BMI 20.20 kg/m²   1.64 meters squared    Physical Exam  Vitals reviewed.   Constitutional:       General: He is not in acute distress.     Appearance: He is normal weight. He is not ill-appearing.   HENT:      Head: Normocephalic and atraumatic.      Right Ear: Tympanic membrane and ear canal normal.      Left Ear:  Tympanic membrane and ear canal normal.      Nose: Nose normal.      Mouth/Throat:      Mouth: Mucous membranes are moist.      Pharynx: Oropharynx is clear. No oropharyngeal exudate or posterior oropharyngeal erythema.   Eyes:      General: Scleral icterus present.   Cardiovascular:      Rate and Rhythm: Normal rate and regular rhythm.      Pulses: Normal pulses.      Heart sounds: Murmur heard.      No gallop.   Pulmonary:      Effort: Pulmonary effort is normal. No respiratory distress.      Breath sounds: Normal breath sounds. No wheezing or rales.   Abdominal:      General: Bowel sounds are normal. There is no distension.      Palpations: Abdomen is soft.      Tenderness: There is no abdominal tenderness. There is no guarding.   Musculoskeletal:         General: No swelling or deformity. Normal range of motion.      Cervical back: Normal range of motion and neck supple. No rigidity.      Right lower leg: No edema.      Left lower leg: No edema.   Skin:     General: Skin is warm.      Capillary Refill: Capillary refill takes less than 2 seconds.      Coloration: Skin is not jaundiced.      Findings: No bruising.   Neurological:      General: No focal deficit present.      Mental Status: He is alert and oriented to person, place, and time. Mental status is at baseline.      Cranial Nerves: No cranial nerve deficit.   Psychiatric:         Mood and Affect: Mood normal.         Behavior: Behavior normal.       LABS   Infusion on 03/17/2025   Component Date Value Ref Range Status    PRODUCT CODE 03/16/2025 [id number]   Final    Unit Number 03/16/2025 [id number]   Final    Unit ABO 03/16/2025 O   Final    Unit RH 03/16/2025 NEG   Final    XM INTEP 03/16/2025 COMP   Final    Dispense Status 03/16/2025 TR   Final    Blood Expiration Date 03/16/2025 4/2/2025 11:59:00 PM EDT   Final    PRODUCT BLOOD TYPE 03/16/2025 9500   Final    UNIT VOLUME 03/16/2025 274   Final-Edited    PRODUCT CODE 03/16/2025 [id number]   Final     Unit Number 03/16/2025 [id number]   Final    Unit ABO 03/16/2025 O   Final    Unit RH 03/16/2025 NEG   Final    XM INTEP 03/16/2025 COMP   Final    Dispense Status 03/16/2025 TR   Final    Blood Expiration Date 03/16/2025 4/13/2025 11:59:00 PM EDT   Final    PRODUCT BLOOD TYPE 03/16/2025 9500   Final    UNIT VOLUME 03/16/2025 350   Final-Edited    PRODUCT CODE 03/17/2025 [id number]   Final    Unit Number 03/17/2025 [id number]   Final    Unit ABO 03/17/2025 O   Final    Unit RH 03/17/2025 NEG   Final    XM INTEP 03/17/2025 COMP   Final    Dispense Status 03/17/2025 TR   Final    Blood Expiration Date 03/17/2025 4/1/2025 11:59:00 PM EDT   Final    PRODUCT BLOOD TYPE 03/17/2025 9500   Final    UNIT VOLUME 03/17/2025 350   Final-Edited    WBC 03/17/2025 14.9 (H)  4.4 - 11.3 x10*3/uL Final    nRBC 03/17/2025 6.2 (H)  0.0 - 0.0 /100 WBCs Final    RBC 03/17/2025 3.37 (L)  4.50 - 5.90 x10*6/uL Final    Hemoglobin 03/17/2025 10.4 (L)  13.5 - 17.5 g/dL Final    Hematocrit 03/17/2025 30.1 (L)  41.0 - 52.0 % Final    MCV 03/17/2025 89  80 - 100 fL Final    MCH 03/17/2025 30.9  26.0 - 34.0 pg Final    MCHC 03/17/2025 34.6  32.0 - 36.0 g/dL Final    RDW 03/17/2025 18.4 (H)  11.5 - 14.5 % Final    Platelets 03/17/2025 366  150 - 450 x10*3/uL Final    Neutrophils % 03/17/2025 55.4  40.0 - 80.0 % Final    Immature Granulocytes %, Automated 03/17/2025 0.8  0.0 - 0.9 % Final    Immature Granulocyte Count (IG) includes promyelocytes, myelocytes and metamyelocytes but does not include bands. Percent differential counts (%) should be interpreted in the context of the absolute cell counts (cells/UL).    Lymphocytes % 03/17/2025 27.0  13.0 - 44.0 % Final    Monocytes % 03/17/2025 14.2  2.0 - 10.0 % Final    Eosinophils % 03/17/2025 1.7  0.0 - 6.0 % Final    Basophils % 03/17/2025 0.9  0.0 - 2.0 % Final    Neutrophils Absolute 03/17/2025 8.27 (H)  1.20 - 7.70 x10*3/uL Final    Percent differential counts (%) should be interpreted in  the context of the absolute cell counts (cells/uL).    Immature Granulocytes Absolute, Au* 03/17/2025 0.12  0.00 - 0.70 x10*3/uL Final    Lymphocytes Absolute 03/17/2025 4.02  1.20 - 4.80 x10*3/uL Final    Monocytes Absolute 03/17/2025 2.11 (H)  0.10 - 1.00 x10*3/uL Final    Eosinophils Absolute 03/17/2025 0.26  0.00 - 0.70 x10*3/uL Final    Basophils Absolute 03/17/2025 0.13 (H)  0.00 - 0.10 x10*3/uL Final    Hemoglobin A 03/17/2025 61.6 (L)  95.8 - 98.0 % Final    Hemoglobin F 03/17/2025 0.8  0.0 - 2.0 % Final    Hemoglobin S 03/17/2025 34.9 (H)  <=0.0 % Final    Hemoglobin A2 03/17/2025 2.7  2.0 - 3.5 % Final    Hemoglobin Identification Interpre* 03/17/2025 See Below (A)  Normal Final    Known Sickle Cell Anemia post-transfusion          Brett Murray MD

## 2025-04-09 ENCOUNTER — TELEPHONE (OUTPATIENT)
Dept: HEMATOLOGY/ONCOLOGY | Facility: HOSPITAL | Age: 20
End: 2025-04-09

## 2025-04-11 ENCOUNTER — LAB (OUTPATIENT)
Dept: LAB | Facility: HOSPITAL | Age: 20
End: 2025-04-11
Payer: COMMERCIAL

## 2025-04-11 DIAGNOSIS — D57.09 SICKLE CELL DISEASE WITH CRISIS AND OTHER COMPLICATION: ICD-10-CM

## 2025-04-11 DIAGNOSIS — Z51.89 ENCOUNTER FOR BLOOD TRANSFUSION: ICD-10-CM

## 2025-04-11 LAB
ABO GROUP (TYPE) IN BLOOD: NORMAL
ANTIBODY SCREEN: NORMAL
BASOPHILS # BLD AUTO: 0.11 X10*3/UL (ref 0–0.1)
BASOPHILS NFR BLD AUTO: 0.5 %
BURR CELLS BLD QL SMEAR: NORMAL
EOSINOPHIL # BLD AUTO: 0.45 X10*3/UL (ref 0–0.7)
EOSINOPHIL NFR BLD AUTO: 1.9 %
ERYTHROCYTE [DISTWIDTH] IN BLOOD BY AUTOMATED COUNT: 18.6 % (ref 11.5–14.5)
GIANT PLATELETS BLD QL SMEAR: NORMAL
HCT VFR BLD AUTO: 26.8 % (ref 41–52)
HGB BLD-MCNC: 9.1 G/DL (ref 13.5–17.5)
HOWELL-JOLLY BOD BLD QL SMEAR: PRESENT
IMM GRANULOCYTES # BLD AUTO: 0.16 X10*3/UL (ref 0–0.7)
IMM GRANULOCYTES NFR BLD AUTO: 0.7 % (ref 0–0.9)
LYMPHOCYTES # BLD AUTO: 2.69 X10*3/UL (ref 1.2–4.8)
LYMPHOCYTES NFR BLD AUTO: 11.2 %
MCH RBC QN AUTO: 31 PG (ref 26–34)
MCHC RBC AUTO-ENTMCNC: 34 G/DL (ref 32–36)
MCV RBC AUTO: 91 FL (ref 80–100)
MONOCYTES # BLD AUTO: 2.35 X10*3/UL (ref 0.1–1)
MONOCYTES NFR BLD AUTO: 9.8 %
NEUTROPHILS # BLD AUTO: 18.18 X10*3/UL (ref 1.2–7.7)
NEUTROPHILS NFR BLD AUTO: 75.9 %
NRBC BLD-RTO: 1.2 /100 WBCS (ref 0–0)
PAPPENHEIMER BOD BLD QL SMEAR: PRESENT
PLATELET # BLD AUTO: 519 X10*3/UL (ref 150–450)
POLYCHROMASIA BLD QL SMEAR: NORMAL
RBC # BLD AUTO: 2.94 X10*6/UL (ref 4.5–5.9)
RBC MORPH BLD: NORMAL
RH FACTOR (ANTIGEN D): NORMAL
SCHISTOCYTES BLD QL SMEAR: NORMAL
SICKLE CELLS BLD QL SMEAR: NORMAL
TARGETS BLD QL SMEAR: NORMAL
WBC # BLD AUTO: 23.9 X10*3/UL (ref 4.4–11.3)

## 2025-04-11 PROCEDURE — 86920 COMPATIBILITY TEST SPIN: CPT

## 2025-04-11 PROCEDURE — 83021 HEMOGLOBIN CHROMOTOGRAPHY: CPT

## 2025-04-11 PROCEDURE — 36415 COLL VENOUS BLD VENIPUNCTURE: CPT

## 2025-04-11 PROCEDURE — 86900 BLOOD TYPING SEROLOGIC ABO: CPT

## 2025-04-11 PROCEDURE — 85025 COMPLETE CBC W/AUTO DIFF WBC: CPT

## 2025-04-11 PROCEDURE — 83020 HEMOGLOBIN ELECTROPHORESIS: CPT | Performed by: PATHOLOGY

## 2025-04-13 RX ORDER — FAMOTIDINE 10 MG/ML
20 INJECTION, SOLUTION INTRAVENOUS ONCE AS NEEDED
Status: CANCELLED | OUTPATIENT
Start: 2025-04-14

## 2025-04-13 RX ORDER — ACETAMINOPHEN 325 MG/1
650 TABLET ORAL ONCE
Status: CANCELLED | OUTPATIENT
Start: 2025-04-14

## 2025-04-13 RX ORDER — EPINEPHRINE 0.3 MG/.3ML
0.3 INJECTION SUBCUTANEOUS EVERY 5 MIN PRN
Status: CANCELLED | OUTPATIENT
Start: 2025-04-14

## 2025-04-13 RX ORDER — DIPHENHYDRAMINE HYDROCHLORIDE 50 MG/ML
50 INJECTION, SOLUTION INTRAMUSCULAR; INTRAVENOUS AS NEEDED
Status: CANCELLED | OUTPATIENT
Start: 2025-04-14

## 2025-04-13 RX ORDER — DIPHENHYDRAMINE HCL 25 MG
25 CAPSULE ORAL ONCE
Status: CANCELLED | OUTPATIENT
Start: 2025-04-14

## 2025-04-13 RX ORDER — ALBUTEROL SULFATE 0.83 MG/ML
3 SOLUTION RESPIRATORY (INHALATION) AS NEEDED
Status: CANCELLED | OUTPATIENT
Start: 2025-04-14

## 2025-04-14 ENCOUNTER — INFUSION (OUTPATIENT)
Dept: HEMATOLOGY/ONCOLOGY | Facility: HOSPITAL | Age: 20
End: 2025-04-14
Payer: COMMERCIAL

## 2025-04-14 VITALS
DIASTOLIC BLOOD PRESSURE: 70 MMHG | TEMPERATURE: 98.4 F | RESPIRATION RATE: 18 BRPM | WEIGHT: 127 LBS | BODY MASS INDEX: 20.27 KG/M2 | SYSTOLIC BLOOD PRESSURE: 106 MMHG | HEART RATE: 79 BPM | OXYGEN SATURATION: 99 %

## 2025-04-14 DIAGNOSIS — G93.5 CHIARI MALFORMATION TYPE I (MULTI): ICD-10-CM

## 2025-04-14 DIAGNOSIS — D57.09 SICKLE CELL DISEASE WITH CRISIS AND OTHER COMPLICATION: ICD-10-CM

## 2025-04-14 DIAGNOSIS — Z51.89 ENCOUNTER FOR BLOOD TRANSFUSION: Primary | ICD-10-CM

## 2025-04-14 DIAGNOSIS — D57.1 SICKLE CELL DISEASE WITHOUT CRISIS (MULTI): ICD-10-CM

## 2025-04-14 DIAGNOSIS — Z51.89 ENCOUNTER FOR BLOOD TRANSFUSION: ICD-10-CM

## 2025-04-14 LAB
HEMOGLOBIN A2: 2.7 % (ref 2–3.5)
HEMOGLOBIN A: 59.9 % (ref 95.8–98)
HEMOGLOBIN F: 0.9 % (ref 0–2)
HEMOGLOBIN IDENTIFICATION INTERPRETATION: ABNORMAL
HEMOGLOBIN S: 36.5 %
PATH REVIEW-HGB IDENTIFICATION: ABNORMAL

## 2025-04-14 PROCEDURE — 86902 BLOOD TYPE ANTIGEN DONOR EA: CPT

## 2025-04-14 PROCEDURE — 2500000001 HC RX 250 WO HCPCS SELF ADMINISTERED DRUGS (ALT 637 FOR MEDICARE OP): Mod: SE | Performed by: PEDIATRICS

## 2025-04-14 PROCEDURE — 36430 TRANSFUSION BLD/BLD COMPNT: CPT

## 2025-04-14 PROCEDURE — P9040 RBC LEUKOREDUCED IRRADIATED: HCPCS

## 2025-04-14 RX ORDER — ALBUTEROL SULFATE 0.83 MG/ML
3 SOLUTION RESPIRATORY (INHALATION) AS NEEDED
OUTPATIENT
Start: 2025-05-11

## 2025-04-14 RX ORDER — DIPHENHYDRAMINE HCL 25 MG
25 CAPSULE ORAL ONCE
Status: COMPLETED | OUTPATIENT
Start: 2025-04-14 | End: 2025-04-14

## 2025-04-14 RX ORDER — FAMOTIDINE 10 MG/ML
20 INJECTION, SOLUTION INTRAVENOUS ONCE AS NEEDED
Status: DISCONTINUED | OUTPATIENT
Start: 2025-04-14 | End: 2025-04-14 | Stop reason: HOSPADM

## 2025-04-14 RX ORDER — ACETAMINOPHEN 325 MG/1
650 TABLET ORAL ONCE
OUTPATIENT
Start: 2025-05-11

## 2025-04-14 RX ORDER — DIPHENHYDRAMINE HCL 25 MG
25 CAPSULE ORAL ONCE
OUTPATIENT
Start: 2025-05-11

## 2025-04-14 RX ORDER — EPINEPHRINE 0.3 MG/.3ML
0.3 INJECTION SUBCUTANEOUS EVERY 5 MIN PRN
Status: DISCONTINUED | OUTPATIENT
Start: 2025-04-14 | End: 2025-04-14 | Stop reason: HOSPADM

## 2025-04-14 RX ORDER — LIDOCAINE 40 MG/G
CREAM TOPICAL ONCE AS NEEDED
OUTPATIENT
Start: 2025-05-09 | End: 2026-05-09

## 2025-04-14 RX ORDER — FAMOTIDINE 10 MG/ML
20 INJECTION, SOLUTION INTRAVENOUS ONCE AS NEEDED
OUTPATIENT
Start: 2025-05-11

## 2025-04-14 RX ORDER — DIPHENHYDRAMINE HYDROCHLORIDE 50 MG/ML
50 INJECTION, SOLUTION INTRAMUSCULAR; INTRAVENOUS AS NEEDED
Status: DISCONTINUED | OUTPATIENT
Start: 2025-04-14 | End: 2025-04-14 | Stop reason: HOSPADM

## 2025-04-14 RX ORDER — DIPHENHYDRAMINE HYDROCHLORIDE 50 MG/ML
50 INJECTION, SOLUTION INTRAMUSCULAR; INTRAVENOUS AS NEEDED
OUTPATIENT
Start: 2025-05-11

## 2025-04-14 RX ORDER — ALBUTEROL SULFATE 0.83 MG/ML
3 SOLUTION RESPIRATORY (INHALATION) AS NEEDED
Status: DISCONTINUED | OUTPATIENT
Start: 2025-04-14 | End: 2025-04-14 | Stop reason: HOSPADM

## 2025-04-14 RX ORDER — EPINEPHRINE 0.3 MG/.3ML
0.3 INJECTION SUBCUTANEOUS EVERY 5 MIN PRN
OUTPATIENT
Start: 2025-05-11

## 2025-04-14 RX ORDER — ACETAMINOPHEN 325 MG/1
650 TABLET ORAL ONCE
Status: COMPLETED | OUTPATIENT
Start: 2025-04-14 | End: 2025-04-14

## 2025-04-14 RX ADMIN — ACETAMINOPHEN 650 MG: 325 TABLET ORAL at 12:48

## 2025-04-14 RX ADMIN — DIPHENHYDRAMINE HYDROCHLORIDE 25 MG: 25 CAPSULE ORAL at 12:48

## 2025-04-14 NOTE — PROGRESS NOTES
Pt present today for partial red blood cell exchange.  1 unit phlebotomized per order.  2 units transfused per order.  VSS throughout.  Pt discharged to home in stable condition.

## 2025-04-15 LAB
BLOOD EXPIRATION DATE: NORMAL
DISPENSE STATUS: NORMAL
PRODUCT BLOOD TYPE: 9500
PRODUCT CODE: NORMAL
UNIT ABO: NORMAL
UNIT NUMBER: NORMAL
UNIT RH: NORMAL
UNIT VOLUME: 275
UNIT VOLUME: 283
UNIT VOLUME: 350
XM INTEP: NORMAL

## 2025-05-09 ENCOUNTER — LAB (OUTPATIENT)
Dept: LAB | Facility: HOSPITAL | Age: 20
End: 2025-05-09
Payer: COMMERCIAL

## 2025-05-09 DIAGNOSIS — D57.09 SICKLE CELL DISEASE WITH CRISIS AND OTHER COMPLICATION: ICD-10-CM

## 2025-05-09 DIAGNOSIS — Z51.89 ENCOUNTER FOR BLOOD TRANSFUSION: ICD-10-CM

## 2025-05-09 LAB
ABO GROUP (TYPE) IN BLOOD: NORMAL
ANTIBODY SCREEN: NORMAL
BASOPHILS # BLD AUTO: 0.1 X10*3/UL (ref 0–0.1)
BASOPHILS NFR BLD AUTO: 0.7 %
EOSINOPHIL # BLD AUTO: 0.46 X10*3/UL (ref 0–0.7)
EOSINOPHIL NFR BLD AUTO: 3.3 %
ERYTHROCYTE [DISTWIDTH] IN BLOOD BY AUTOMATED COUNT: 19.4 % (ref 11.5–14.5)
HCT VFR BLD AUTO: 24.5 % (ref 41–52)
HEMOGLOBIN A2: ABNORMAL
HEMOGLOBIN A: ABNORMAL
HEMOGLOBIN F: ABNORMAL
HEMOGLOBIN IDENTIFICATION INTERPRETATION: ABNORMAL
HEMOGLOBIN S: 36.2 %
HGB BLD-MCNC: 8.2 G/DL (ref 13.5–17.5)
HOLD SPECIMEN: NORMAL
IMM GRANULOCYTES # BLD AUTO: 0.05 X10*3/UL (ref 0–0.7)
IMM GRANULOCYTES NFR BLD AUTO: 0.4 % (ref 0–0.9)
LYMPHOCYTES # BLD AUTO: 4.47 X10*3/UL (ref 1.2–4.8)
LYMPHOCYTES NFR BLD AUTO: 32.4 %
MCH RBC QN AUTO: 30.4 PG (ref 26–34)
MCHC RBC AUTO-ENTMCNC: 33.5 G/DL (ref 32–36)
MCV RBC AUTO: 91 FL (ref 80–100)
MONOCYTES # BLD AUTO: 2.24 X10*3/UL (ref 0.1–1)
MONOCYTES NFR BLD AUTO: 16.3 %
NEUTROPHILS # BLD AUTO: 6.46 X10*3/UL (ref 1.2–7.7)
NEUTROPHILS NFR BLD AUTO: 46.9 %
NRBC BLD-RTO: 1.8 /100 WBCS (ref 0–0)
PATH REVIEW-HGB IDENTIFICATION: ABNORMAL
PLATELET # BLD AUTO: 458 X10*3/UL (ref 150–450)
RBC # BLD AUTO: 2.7 X10*6/UL (ref 4.5–5.9)
RH FACTOR (ANTIGEN D): NORMAL
WBC # BLD AUTO: 13.8 X10*3/UL (ref 4.4–11.3)

## 2025-05-09 PROCEDURE — 85025 COMPLETE CBC W/AUTO DIFF WBC: CPT

## 2025-05-09 PROCEDURE — 36415 COLL VENOUS BLD VENIPUNCTURE: CPT

## 2025-05-09 PROCEDURE — 83020 HEMOGLOBIN ELECTROPHORESIS: CPT | Performed by: PATHOLOGY

## 2025-05-09 PROCEDURE — 86920 COMPATIBILITY TEST SPIN: CPT

## 2025-05-09 PROCEDURE — 83021 HEMOGLOBIN CHROMOTOGRAPHY: CPT

## 2025-05-09 PROCEDURE — 86901 BLOOD TYPING SEROLOGIC RH(D): CPT

## 2025-05-11 RX ORDER — FAMOTIDINE 10 MG/ML
20 INJECTION, SOLUTION INTRAVENOUS ONCE AS NEEDED
OUTPATIENT
Start: 2025-05-12

## 2025-05-11 RX ORDER — EPINEPHRINE 0.3 MG/.3ML
0.3 INJECTION SUBCUTANEOUS EVERY 5 MIN PRN
OUTPATIENT
Start: 2025-05-12

## 2025-05-11 RX ORDER — ACETAMINOPHEN 325 MG/1
650 TABLET ORAL ONCE
OUTPATIENT
Start: 2025-05-12

## 2025-05-11 RX ORDER — DIPHENHYDRAMINE HCL 25 MG
25 CAPSULE ORAL ONCE
OUTPATIENT
Start: 2025-05-12

## 2025-05-11 RX ORDER — DIPHENHYDRAMINE HYDROCHLORIDE 50 MG/ML
50 INJECTION, SOLUTION INTRAMUSCULAR; INTRAVENOUS AS NEEDED
OUTPATIENT
Start: 2025-05-12

## 2025-05-11 RX ORDER — ALBUTEROL SULFATE 0.83 MG/ML
3 SOLUTION RESPIRATORY (INHALATION) AS NEEDED
OUTPATIENT
Start: 2025-05-12

## 2025-05-12 ENCOUNTER — INFUSION (OUTPATIENT)
Dept: HEMATOLOGY/ONCOLOGY | Facility: HOSPITAL | Age: 20
End: 2025-05-12
Payer: COMMERCIAL

## 2025-05-12 ENCOUNTER — OFFICE VISIT (OUTPATIENT)
Dept: HEMATOLOGY/ONCOLOGY | Facility: HOSPITAL | Age: 20
End: 2025-05-12
Payer: COMMERCIAL

## 2025-05-12 VITALS
SYSTOLIC BLOOD PRESSURE: 129 MMHG | RESPIRATION RATE: 18 BRPM | HEART RATE: 74 BPM | DIASTOLIC BLOOD PRESSURE: 45 MMHG | OXYGEN SATURATION: 100 % | TEMPERATURE: 98.8 F

## 2025-05-12 VITALS
SYSTOLIC BLOOD PRESSURE: 137 MMHG | TEMPERATURE: 97.5 F | DIASTOLIC BLOOD PRESSURE: 59 MMHG | WEIGHT: 129.2 LBS | OXYGEN SATURATION: 98 % | RESPIRATION RATE: 15 BRPM | BODY MASS INDEX: 20.62 KG/M2 | HEART RATE: 83 BPM

## 2025-05-12 DIAGNOSIS — Z51.89 ENCOUNTER FOR BLOOD TRANSFUSION: ICD-10-CM

## 2025-05-12 DIAGNOSIS — D57.09 SICKLE CELL DISEASE WITH CRISIS AND OTHER COMPLICATION: ICD-10-CM

## 2025-05-12 DIAGNOSIS — D57.09 SICKLE CELL DISEASE WITH CRISIS AND OTHER COMPLICATION: Primary | ICD-10-CM

## 2025-05-12 LAB
BLOOD EXPIRATION DATE: NORMAL
BLOOD EXPIRATION DATE: NORMAL
DISPENSE STATUS: NORMAL
DISPENSE STATUS: NORMAL
HEMOGLOBIN A2: 2.4 % (ref 2–3.5)
HEMOGLOBIN A: 60.7 % (ref 95.8–98)
HEMOGLOBIN F: 0.7 % (ref 0–2)
HEMOGLOBIN IDENTIFICATION INTERPRETATION: ABNORMAL
HEMOGLOBIN S: 36.2 %
PATH REVIEW-HGB IDENTIFICATION: ABNORMAL
PRODUCT BLOOD TYPE: 9500
PRODUCT BLOOD TYPE: 9500
PRODUCT CODE: NORMAL
PRODUCT CODE: NORMAL
UNIT ABO: NORMAL
UNIT ABO: NORMAL
UNIT NUMBER: NORMAL
UNIT NUMBER: NORMAL
UNIT RH: NORMAL
UNIT RH: NORMAL
UNIT VOLUME: 294
UNIT VOLUME: 350
XM INTEP: NORMAL
XM INTEP: NORMAL

## 2025-05-12 PROCEDURE — 2500000001 HC RX 250 WO HCPCS SELF ADMINISTERED DRUGS (ALT 637 FOR MEDICARE OP): Mod: SE | Performed by: PEDIATRICS

## 2025-05-12 PROCEDURE — 99214 OFFICE O/P EST MOD 30 MIN: CPT | Mod: 25 | Performed by: NURSE PRACTITIONER

## 2025-05-12 PROCEDURE — P9040 RBC LEUKOREDUCED IRRADIATED: HCPCS

## 2025-05-12 PROCEDURE — 36430 TRANSFUSION BLD/BLD COMPNT: CPT

## 2025-05-12 PROCEDURE — 99214 OFFICE O/P EST MOD 30 MIN: CPT | Performed by: NURSE PRACTITIONER

## 2025-05-12 RX ORDER — FAMOTIDINE 10 MG/ML
20 INJECTION, SOLUTION INTRAVENOUS ONCE AS NEEDED
Status: DISCONTINUED | OUTPATIENT
Start: 2025-05-12 | End: 2025-05-12 | Stop reason: HOSPADM

## 2025-05-12 RX ORDER — ACETAMINOPHEN 325 MG/1
650 TABLET ORAL ONCE
Status: COMPLETED | OUTPATIENT
Start: 2025-05-12 | End: 2025-05-12

## 2025-05-12 RX ORDER — DIPHENHYDRAMINE HCL 25 MG
25 CAPSULE ORAL ONCE
Status: COMPLETED | OUTPATIENT
Start: 2025-05-12 | End: 2025-05-12

## 2025-05-12 RX ORDER — ALBUTEROL SULFATE 0.83 MG/ML
3 SOLUTION RESPIRATORY (INHALATION) AS NEEDED
Status: DISCONTINUED | OUTPATIENT
Start: 2025-05-12 | End: 2025-05-12 | Stop reason: HOSPADM

## 2025-05-12 RX ORDER — DIPHENHYDRAMINE HYDROCHLORIDE 50 MG/ML
50 INJECTION, SOLUTION INTRAMUSCULAR; INTRAVENOUS AS NEEDED
Status: DISCONTINUED | OUTPATIENT
Start: 2025-05-12 | End: 2025-05-12 | Stop reason: HOSPADM

## 2025-05-12 RX ORDER — EPINEPHRINE 0.3 MG/.3ML
0.3 INJECTION SUBCUTANEOUS EVERY 5 MIN PRN
Status: DISCONTINUED | OUTPATIENT
Start: 2025-05-12 | End: 2025-05-12 | Stop reason: HOSPADM

## 2025-05-12 RX ADMIN — DIPHENHYDRAMINE HYDROCHLORIDE 25 MG: 25 CAPSULE ORAL at 13:24

## 2025-05-12 RX ADMIN — ACETAMINOPHEN 650 MG: 325 TABLET ORAL at 13:24

## 2025-05-12 ASSESSMENT — PAIN SCALES - GENERAL: PAINLEVEL_OUTOF10: 0-NO PAIN

## 2025-05-12 NOTE — PROGRESS NOTES
Patient ID: Lana Boss is a 19 y.o. male.  Referring Physician: No referring provider defined for this encounter.  Primary Care Provider: BRYNN Dinero-CNP  Visit Type: Follow Up      Subjective  19 year old black male presents for follow up and pre exchange appointment for Sickle Cell Disease Hb SS Type. He has his girlfriend in the appointment. He admits to smoking Weed daily. He does not use Supplemental Oxygen at home. He denies pain today. He uses Tylenol and Ibuprofen for pain. OARRS reviewed. When he has pain it is usually in his lower back. 9/12/2024 was his last visit to ED. He takes Hydrea as disease modifying treatment. He does monthly modified partial PRBC exchanges. He does not work or attend school currently. He denies priapism. States he does have some constipation and doesn't treat it.    HPI    Review of Systems - Oncology     Objective   BSA: 1.66 meters squared  /59 (BP Location: Left arm, Patient Position: Sitting, BP Cuff Size: Small adult)   Pulse 83   Temp 36.4 °C (97.5 °F) (Skin)   Resp 15   Wt 58.6 kg (129 lb 3.2 oz)   SpO2 98%   BMI 20.62 kg/m²      has a past medical history of Arnold-Chiari malformation, type I (Multi) (01/11/2017), Cardiac murmur, unspecified (02/16/2018), Cardiac murmur, unspecified (02/16/2018), Compression of brain (Multi) (02/16/2018), Compression of brain (Multi) (02/16/2018), Encounter for immunization (02/16/2018), Encounter for immunization (02/16/2018), Epistaxis (02/16/2018), Epistaxis (02/16/2018), Fever (08/09/2024), Fever, unspecified, History of cerebrovascular accident (08/09/2024), History of viral infection (08/09/2024), Immunization due (08/09/2024), 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), Myopia, unspecified eye (02/16/2018), Other general symptoms and signs (02/16/2018), Other general symptoms and signs (02/16/2018), Personal history of other diseases of the digestive system (11/13/2014), Personal history of other diseases of  the nervous system and sense organs (08/25/2013), Personal history of other infectious and parasitic diseases, Personal history of other infectious and parasitic diseases, Personal history of other specified conditions (11/13/2014), Personal history of transient ischemic attack (TIA), and cerebral infarction without residual deficits, Sickle-cell disease without crisis (Multi) (07/06/2016), and Unspecified corneal scar and opacity (02/16/2018).   has a past surgical history that includes Splenectomy, total (11/13/2014); MR angio neck wo IV contrast (4/25/2016); and MR angio head wo IV contrast (4/25/2016).  Family History[1]  Oncology History    No history exists.       Lana Boss  reports that he has been smoking cigarettes. He does not have any smokeless tobacco history on file.  He  has no history on file for alcohol use.  He  has no history on file for drug use.    Physical Exam  Vitals reviewed. Exam conducted with a chaperone present (Girlfriend in appointment).   Constitutional:       Appearance: Normal appearance. He is normal weight.   HENT:      Head: Normocephalic and atraumatic.      Nose: Nose normal.      Mouth/Throat:      Mouth: Mucous membranes are moist.   Eyes:      General: Scleral icterus present.   Cardiovascular:      Rate and Rhythm: Normal rate.      Pulses: Normal pulses.   Pulmonary:      Effort: Pulmonary effort is normal.      Breath sounds: Normal breath sounds.   Abdominal:      General: Abdomen is flat. Bowel sounds are normal.      Palpations: Abdomen is soft.   Musculoskeletal:         General: Normal range of motion.      Cervical back: Normal range of motion and neck supple.   Skin:     General: Skin is warm and dry.      Capillary Refill: Capillary refill takes less than 2 seconds.   Neurological:      General: No focal deficit present.      Mental Status: He is alert and oriented to person, place, and time.   Psychiatric:         Behavior: Behavior normal.      Comments:  Blames his parents for not waking him up on time to attend appointment at agreed upon time. Somewhat defensive.     WBC   Date/Time Value Ref Range Status   05/09/2025 09:06 AM 13.8 (H) 4.4 - 11.3 x10*3/uL Final   04/11/2025 12:05 PM 23.9 (H) 4.4 - 11.3 x10*3/uL Final   03/17/2025 06:26 PM 14.9 (H) 4.4 - 11.3 x10*3/uL Final     nRBC   Date Value Ref Range Status   05/09/2025 1.8 (H) 0.0 - 0.0 /100 WBCs Final   04/11/2025 1.2 (H) 0.0 - 0.0 /100 WBCs Final   03/17/2025 6.2 (H) 0.0 - 0.0 /100 WBCs Final     RBC   Date Value Ref Range Status   05/09/2025 2.70 (L) 4.50 - 5.90 x10*6/uL Final   04/11/2025 2.94 (L) 4.50 - 5.90 x10*6/uL Final   03/17/2025 3.37 (L) 4.50 - 5.90 x10*6/uL Final     Hemoglobin   Date Value Ref Range Status   05/09/2025 8.2 (L) 13.5 - 17.5 g/dL Final   04/11/2025 9.1 (L) 13.5 - 17.5 g/dL Final   03/17/2025 10.4 (L) 13.5 - 17.5 g/dL Final     Hematocrit   Date Value Ref Range Status   05/09/2025 24.5 (L) 41.0 - 52.0 % Final   04/11/2025 26.8 (L) 41.0 - 52.0 % Final   03/17/2025 30.1 (L) 41.0 - 52.0 % Final     MCV   Date/Time Value Ref Range Status   05/09/2025 09:06 AM 91 80 - 100 fL Final   04/11/2025 12:05 PM 91 80 - 100 fL Final   03/17/2025 06:26 PM 89 80 - 100 fL Final     MCH   Date/Time Value Ref Range Status   05/09/2025 09:06 AM 30.4 26.0 - 34.0 pg Final   04/11/2025 12:05 PM 31.0 26.0 - 34.0 pg Final   03/17/2025 06:26 PM 30.9 26.0 - 34.0 pg Final     MCHC   Date/Time Value Ref Range Status   05/09/2025 09:06 AM 33.5 32.0 - 36.0 g/dL Final   04/11/2025 12:05 PM 34.0 32.0 - 36.0 g/dL Final   03/17/2025 06:26 PM 34.6 32.0 - 36.0 g/dL Final     RDW   Date/Time Value Ref Range Status   05/09/2025 09:06 AM 19.4 (H) 11.5 - 14.5 % Final   04/11/2025 12:05 PM 18.6 (H) 11.5 - 14.5 % Final   03/17/2025 06:26 PM 18.4 (H) 11.5 - 14.5 % Final     Platelets   Date/Time Value Ref Range Status   05/09/2025 09:06  (H) 150 - 450 x10*3/uL Final   04/11/2025 12:05  (H) 150 - 450 x10*3/uL Final    03/17/2025 06:26  150 - 450 x10*3/uL Final     MPV   Date/Time Value Ref Range Status   10/25/2023 09:14 AM 9.7 7.5 - 11.5 fL Final     Neutrophils %   Date/Time Value Ref Range Status   05/09/2025 09:06 AM 46.9 40.0 - 80.0 % Final   04/11/2025 12:05 PM 75.9 40.0 - 80.0 % Final   03/17/2025 06:26 PM 55.4 40.0 - 80.0 % Final     Immature Granulocytes %, Automated   Date/Time Value Ref Range Status   05/09/2025 09:06 AM 0.4 0.0 - 0.9 % Final     Comment:     Immature Granulocyte Count (IG) includes promyelocytes, myelocytes and metamyelocytes but does not include bands. Percent differential counts (%) should be interpreted in the context of the absolute cell counts (cells/UL).   04/11/2025 12:05 PM 0.7 0.0 - 0.9 % Final     Comment:     Immature Granulocyte Count (IG) includes promyelocytes, myelocytes and metamyelocytes but does not include bands. Percent differential counts (%) should be interpreted in the context of the absolute cell counts (cells/UL).   03/17/2025 06:26 PM 0.8 0.0 - 0.9 % Final     Comment:     Immature Granulocyte Count (IG) includes promyelocytes, myelocytes and metamyelocytes but does not include bands. Percent differential counts (%) should be interpreted in the context of the absolute cell counts (cells/UL).     Lymphocytes %, Manual   Date/Time Value Ref Range Status   06/07/2024 09:07 AM 25.9 13.0 - 44.0 % Final   03/16/2024 10:35 AM 18.8 13.0 - 44.0 % Final     Lymphocytes %   Date/Time Value Ref Range Status   05/09/2025 09:06 AM 32.4 13.0 - 44.0 % Final   04/11/2025 12:05 PM 11.2 13.0 - 44.0 % Final   03/17/2025 06:26 PM 27.0 13.0 - 44.0 % Final     Monocytes %, Manual   Date/Time Value Ref Range Status   06/07/2024 09:07 AM 8.6 2.0 - 10.0 % Final   03/16/2024 10:35 AM 9.4 2.0 - 10.0 % Final     Monocytes %   Date/Time Value Ref Range Status   05/09/2025 09:06 AM 16.3 2.0 - 10.0 % Final   04/11/2025 12:05 PM 9.8 2.0 - 10.0 % Final   03/17/2025 06:26 PM 14.2 2.0 - 10.0 % Final      Eosinophils %, Manual   Date/Time Value Ref Range Status   06/07/2024 09:07 AM 0.8 0.0 - 6.0 % Final   03/16/2024 10:35 AM 3.4 0.0 - 6.0 % Final     Eosinophils %   Date/Time Value Ref Range Status   05/09/2025 09:06 AM 3.3 0.0 - 6.0 % Final   04/11/2025 12:05 PM 1.9 0.0 - 6.0 % Final   03/17/2025 06:26 PM 1.7 0.0 - 6.0 % Final     Basophils %, Manual   Date/Time Value Ref Range Status   06/07/2024 09:07 AM 2.6 0.0 - 2.0 % Final   03/16/2024 10:35 AM 1.7 0.0 - 2.0 % Final     Basophils %   Date/Time Value Ref Range Status   05/09/2025 09:06 AM 0.7 0.0 - 2.0 % Final   04/11/2025 12:05 PM 0.5 0.0 - 2.0 % Final   03/17/2025 06:26 PM 0.9 0.0 - 2.0 % Final     Neutrophils Absolute   Date/Time Value Ref Range Status   05/09/2025 09:06 AM 6.46 1.20 - 7.70 x10*3/uL Final     Comment:     Percent differential counts (%) should be interpreted in the context of the absolute cell counts (cells/uL).   04/11/2025 12:05 PM 18.18 (H) 1.20 - 7.70 x10*3/uL Final     Comment:     Percent differential counts (%) should be interpreted in the context of the absolute cell counts (cells/uL).   03/17/2025 06:26 PM 8.27 (H) 1.20 - 7.70 x10*3/uL Final     Comment:     Percent differential counts (%) should be interpreted in the context of the absolute cell counts (cells/uL).     Immature Granulocytes Absolute, Automated   Date/Time Value Ref Range Status   05/09/2025 09:06 AM 0.05 0.00 - 0.70 x10*3/uL Final   04/11/2025 12:05 PM 0.16 0.00 - 0.70 x10*3/uL Final   03/17/2025 06:26 PM 0.12 0.00 - 0.70 x10*3/uL Final     Lymphocytes Absolute   Date/Time Value Ref Range Status   05/09/2025 09:06 AM 4.47 1.20 - 4.80 x10*3/uL Final   04/11/2025 12:05 PM 2.69 1.20 - 4.80 x10*3/uL Final   03/17/2025 06:26 PM 4.02 1.20 - 4.80 x10*3/uL Final     Monocytes Absolute   Date/Time Value Ref Range Status   05/09/2025 09:06 AM 2.24 (H) 0.10 - 1.00 x10*3/uL Final   04/11/2025 12:05 PM 2.35 (H) 0.10 - 1.00 x10*3/uL Final   03/17/2025 06:26 PM 2.11 (H) 0.10 -  "1.00 x10*3/uL Final     Eosinophils Absolute   Date/Time Value Ref Range Status   05/09/2025 09:06 AM 0.46 0.00 - 0.70 x10*3/uL Final   04/11/2025 12:05 PM 0.45 0.00 - 0.70 x10*3/uL Final   03/17/2025 06:26 PM 0.26 0.00 - 0.70 x10*3/uL Final     Eosinophils Absolute, Manual   Date/Time Value Ref Range Status   06/07/2024 09:07 AM 0.14 0.00 - 0.70 x10*3/uL Final   03/16/2024 10:35 AM 0.31 0.00 - 0.70 x10*3/uL Final     Basophils Absolute   Date/Time Value Ref Range Status   05/09/2025 09:06 AM 0.10 0.00 - 0.10 x10*3/uL Final   04/11/2025 12:05 PM 0.11 (H) 0.00 - 0.10 x10*3/uL Final     Comment:     Automated WBC differential has been confirmed by manual smear.   03/17/2025 06:26 PM 0.13 (H) 0.00 - 0.10 x10*3/uL Final     Basophils Absolute, Manual   Date/Time Value Ref Range Status   06/07/2024 09:07 AM 0.46 (H) 0.00 - 0.10 x10*3/uL Final   03/16/2024 10:35 AM 0.16 (H) 0.00 - 0.10 x10*3/uL Final       No components found for: \"PT\"  No results found for: \"APTT\"    Assessment/Plan    Follow up per PRBC Exchange schedule  Continue taking Hydroxyurea 500 mg  Eat high fiber diet and drink ample water                       [1]   Family History  Problem Relation Name Age of Onset    Other (Diabetes mellitus) Mother      Sickle cell anemia Father      Hydrocephalus Brother       "

## 2025-05-12 NOTE — PROGRESS NOTES
Pt arrived to outpatient infusion for partial exchange. Hgb 8.2, 500 ml RBCs removed, 2 units RBCs transfused. Pt tolerated treatment well. VSS. PIV removed. Pt discharged in stable condition and ambulated off unit accompanied by girlfriend.

## 2025-05-19 DIAGNOSIS — D57.1 SICKLE CELL DISEASE WITHOUT CRISIS (MULTI): Primary | ICD-10-CM

## 2025-06-06 ENCOUNTER — LAB (OUTPATIENT)
Dept: LAB | Facility: HOSPITAL | Age: 20
End: 2025-06-06
Payer: COMMERCIAL

## 2025-06-06 DIAGNOSIS — D57.1 SICKLE CELL DISEASE WITHOUT CRISIS (MULTI): ICD-10-CM

## 2025-06-06 LAB
ABO GROUP (TYPE) IN BLOOD: NORMAL
ALBUMIN SERPL BCP-MCNC: 4.5 G/DL (ref 3.4–5)
ALP SERPL-CCNC: 88 U/L (ref 33–120)
ALT SERPL W P-5'-P-CCNC: 46 U/L (ref 10–52)
ANION GAP SERPL CALC-SCNC: 12 MMOL/L (ref 10–20)
ANTIBODY SCREEN: NORMAL
AST SERPL W P-5'-P-CCNC: 36 U/L (ref 9–39)
BASOPHILS # BLD AUTO: 0.11 X10*3/UL (ref 0–0.1)
BASOPHILS NFR BLD AUTO: 0.7 %
BILIRUB SERPL-MCNC: 11 MG/DL (ref 0–1.2)
BUN SERPL-MCNC: 13 MG/DL (ref 6–23)
BURR CELLS BLD QL SMEAR: NORMAL
CA-I BLD-SCNC: 1.25 MMOL/L (ref 1.1–1.33)
CALCIUM SERPL-MCNC: 9.4 MG/DL (ref 8.6–10.3)
CHLORIDE SERPL-SCNC: 106 MMOL/L (ref 98–107)
CO2 SERPL-SCNC: 25 MMOL/L (ref 21–32)
CREAT SERPL-MCNC: 0.72 MG/DL (ref 0.5–1.3)
EGFRCR SERPLBLD CKD-EPI 2021: >90 ML/MIN/1.73M*2
EOSINOPHIL # BLD AUTO: 0.46 X10*3/UL (ref 0–0.7)
EOSINOPHIL NFR BLD AUTO: 2.7 %
ERYTHROCYTE [DISTWIDTH] IN BLOOD BY AUTOMATED COUNT: 19.6 % (ref 11.5–14.5)
FERRITIN SERPL-MCNC: 3962 NG/ML (ref 20–300)
GLUCOSE SERPL-MCNC: 90 MG/DL (ref 74–99)
HCT VFR BLD AUTO: 28.3 % (ref 41–52)
HGB BLD-MCNC: 9.4 G/DL (ref 13.5–17.5)
HGB RETIC QN: 35 PG (ref 28–38)
IMM GRANULOCYTES # BLD AUTO: 0.08 X10*3/UL (ref 0–0.7)
IMM GRANULOCYTES NFR BLD AUTO: 0.5 % (ref 0–0.9)
IMMATURE RETIC FRACTION: 36.8 %
LDH SERPL L TO P-CCNC: 251 U/L (ref 84–246)
LYMPHOCYTES # BLD AUTO: 4.01 X10*3/UL (ref 1.2–4.8)
LYMPHOCYTES NFR BLD AUTO: 23.7 %
MCH RBC QN AUTO: 30.3 PG (ref 26–34)
MCHC RBC AUTO-ENTMCNC: 33.2 G/DL (ref 32–36)
MCV RBC AUTO: 91 FL (ref 80–100)
MONOCYTES # BLD AUTO: 1.81 X10*3/UL (ref 0.1–1)
MONOCYTES NFR BLD AUTO: 10.7 %
NEUTROPHILS # BLD AUTO: 10.44 X10*3/UL (ref 1.2–7.7)
NEUTROPHILS NFR BLD AUTO: 61.7 %
NRBC BLD-RTO: 0.8 /100 WBCS (ref 0–0)
PAPPENHEIMER BOD BLD QL SMEAR: PRESENT
PLATELET # BLD AUTO: 183 X10*3/UL (ref 150–450)
PLATELET CLUMP BLD QL SMEAR: PRESENT
POLYCHROMASIA BLD QL SMEAR: NORMAL
POTASSIUM SERPL-SCNC: 4 MMOL/L (ref 3.5–5.3)
PROT SERPL-MCNC: 7.4 G/DL (ref 6.4–8.2)
RBC # BLD AUTO: 3.1 X10*6/UL (ref 4.5–5.9)
RBC MORPH BLD: NORMAL
RETICS #: 0.46 X10*6/UL (ref 0.02–0.12)
RETICS/RBC NFR AUTO: 14.9 % (ref 0.5–2)
RH FACTOR (ANTIGEN D): NORMAL
SCHISTOCYTES BLD QL SMEAR: NORMAL
SICKLE CELLS BLD QL SMEAR: NORMAL
SODIUM SERPL-SCNC: 139 MMOL/L (ref 136–145)
STOMATOCYTES BLD QL SMEAR: NORMAL
TARGETS BLD QL SMEAR: NORMAL
WBC # BLD AUTO: 16.9 X10*3/UL (ref 4.4–11.3)

## 2025-06-06 PROCEDURE — 86920 COMPATIBILITY TEST SPIN: CPT

## 2025-06-06 PROCEDURE — 80053 COMPREHEN METABOLIC PANEL: CPT

## 2025-06-06 PROCEDURE — 85025 COMPLETE CBC W/AUTO DIFF WBC: CPT

## 2025-06-06 PROCEDURE — 36415 COLL VENOUS BLD VENIPUNCTURE: CPT

## 2025-06-06 PROCEDURE — 86901 BLOOD TYPING SEROLOGIC RH(D): CPT

## 2025-06-06 PROCEDURE — 83020 HEMOGLOBIN ELECTROPHORESIS: CPT | Performed by: PATHOLOGY

## 2025-06-06 PROCEDURE — 83615 LACTATE (LD) (LDH) ENZYME: CPT

## 2025-06-06 PROCEDURE — 83021 HEMOGLOBIN CHROMOTOGRAPHY: CPT

## 2025-06-06 PROCEDURE — 82728 ASSAY OF FERRITIN: CPT

## 2025-06-06 PROCEDURE — 82330 ASSAY OF CALCIUM: CPT

## 2025-06-06 PROCEDURE — 85045 AUTOMATED RETICULOCYTE COUNT: CPT

## 2025-06-08 RX ORDER — ACETAMINOPHEN 325 MG/1
650 TABLET ORAL ONCE
OUTPATIENT
Start: 2025-06-09

## 2025-06-08 RX ORDER — DIPHENHYDRAMINE HCL 25 MG
25 CAPSULE ORAL ONCE
OUTPATIENT
Start: 2025-06-09

## 2025-06-08 RX ORDER — EPINEPHRINE 0.3 MG/.3ML
0.3 INJECTION SUBCUTANEOUS EVERY 5 MIN PRN
OUTPATIENT
Start: 2025-06-09

## 2025-06-08 RX ORDER — ALBUTEROL SULFATE 0.83 MG/ML
3 SOLUTION RESPIRATORY (INHALATION) AS NEEDED
OUTPATIENT
Start: 2025-06-09

## 2025-06-08 RX ORDER — FAMOTIDINE 10 MG/ML
20 INJECTION, SOLUTION INTRAVENOUS ONCE AS NEEDED
OUTPATIENT
Start: 2025-06-09

## 2025-06-08 RX ORDER — DIPHENHYDRAMINE HYDROCHLORIDE 50 MG/ML
50 INJECTION, SOLUTION INTRAMUSCULAR; INTRAVENOUS AS NEEDED
OUTPATIENT
Start: 2025-06-09

## 2025-06-09 ENCOUNTER — APPOINTMENT (OUTPATIENT)
Dept: HEMATOLOGY/ONCOLOGY | Facility: HOSPITAL | Age: 20
End: 2025-06-09
Payer: COMMERCIAL

## 2025-06-09 ENCOUNTER — INFUSION (OUTPATIENT)
Dept: HEMATOLOGY/ONCOLOGY | Facility: HOSPITAL | Age: 20
End: 2025-06-09
Payer: COMMERCIAL

## 2025-06-09 VITALS
WEIGHT: 126.98 LBS | BODY MASS INDEX: 20.26 KG/M2 | OXYGEN SATURATION: 100 % | SYSTOLIC BLOOD PRESSURE: 107 MMHG | TEMPERATURE: 98.4 F | HEART RATE: 75 BPM | RESPIRATION RATE: 18 BRPM | DIASTOLIC BLOOD PRESSURE: 51 MMHG

## 2025-06-09 DIAGNOSIS — D57.09 SICKLE CELL DISEASE WITH CRISIS AND OTHER COMPLICATION: ICD-10-CM

## 2025-06-09 DIAGNOSIS — Z51.89 ENCOUNTER FOR BLOOD TRANSFUSION: ICD-10-CM

## 2025-06-09 DIAGNOSIS — D57.1 SICKLE CELL DISEASE WITHOUT CRISIS (MULTI): ICD-10-CM

## 2025-06-09 LAB
BLOOD EXPIRATION DATE: NORMAL
BLOOD EXPIRATION DATE: NORMAL
DISPENSE STATUS: NORMAL
DISPENSE STATUS: NORMAL
HEMOGLOBIN A2: 2.5 % (ref 2–3.5)
HEMOGLOBIN A: 59.7 % (ref 95.8–98)
HEMOGLOBIN F: 0.6 % (ref 0–2)
HEMOGLOBIN IDENTIFICATION INTERPRETATION: ABNORMAL
HEMOGLOBIN S: 37.2 %
PATH REVIEW-HGB IDENTIFICATION: ABNORMAL
PRODUCT BLOOD TYPE: 9500
PRODUCT BLOOD TYPE: 9500
PRODUCT CODE: NORMAL
PRODUCT CODE: NORMAL
UNIT ABO: NORMAL
UNIT ABO: NORMAL
UNIT NUMBER: NORMAL
UNIT NUMBER: NORMAL
UNIT RH: NORMAL
UNIT RH: NORMAL
UNIT VOLUME: 280
UNIT VOLUME: 284
XM INTEP: NORMAL
XM INTEP: NORMAL

## 2025-06-09 PROCEDURE — P9040 RBC LEUKOREDUCED IRRADIATED: HCPCS

## 2025-06-09 PROCEDURE — 2500000001 HC RX 250 WO HCPCS SELF ADMINISTERED DRUGS (ALT 637 FOR MEDICARE OP): Mod: SE | Performed by: PEDIATRICS

## 2025-06-09 PROCEDURE — 99195 PHLEBOTOMY: CPT

## 2025-06-09 PROCEDURE — 36430 TRANSFUSION BLD/BLD COMPNT: CPT

## 2025-06-09 RX ORDER — EPINEPHRINE 0.3 MG/.3ML
0.3 INJECTION SUBCUTANEOUS EVERY 5 MIN PRN
Status: DISCONTINUED | OUTPATIENT
Start: 2025-06-09 | End: 2025-06-09 | Stop reason: HOSPADM

## 2025-06-09 RX ORDER — ACETAMINOPHEN 325 MG/1
650 TABLET ORAL ONCE
Status: COMPLETED | OUTPATIENT
Start: 2025-06-09 | End: 2025-06-09

## 2025-06-09 RX ORDER — DIPHENHYDRAMINE HCL 25 MG
25 CAPSULE ORAL ONCE
Status: COMPLETED | OUTPATIENT
Start: 2025-06-09 | End: 2025-06-09

## 2025-06-09 RX ORDER — FAMOTIDINE 10 MG/ML
20 INJECTION, SOLUTION INTRAVENOUS ONCE AS NEEDED
Status: DISCONTINUED | OUTPATIENT
Start: 2025-06-09 | End: 2025-06-09 | Stop reason: HOSPADM

## 2025-06-09 RX ORDER — DIPHENHYDRAMINE HYDROCHLORIDE 50 MG/ML
50 INJECTION, SOLUTION INTRAMUSCULAR; INTRAVENOUS AS NEEDED
Status: DISCONTINUED | OUTPATIENT
Start: 2025-06-09 | End: 2025-06-09 | Stop reason: HOSPADM

## 2025-06-09 RX ORDER — ALBUTEROL SULFATE 0.83 MG/ML
3 SOLUTION RESPIRATORY (INHALATION) AS NEEDED
Status: DISCONTINUED | OUTPATIENT
Start: 2025-06-09 | End: 2025-06-09 | Stop reason: HOSPADM

## 2025-06-09 RX ADMIN — DIPHENHYDRAMINE HYDROCHLORIDE 25 MG: 25 CAPSULE ORAL at 13:00

## 2025-06-09 RX ADMIN — ACETAMINOPHEN 650 MG: 325 TABLET ORAL at 13:00

## 2025-06-09 ASSESSMENT — PAIN SCALES - GENERAL: PAINLEVEL_OUTOF10: 0-NO PAIN

## 2025-06-09 NOTE — PROGRESS NOTES
Patient in for partial exchange. Hgb  9.4 , 500 ml RBCs removed, 2 units RBCs transfused as ordered .Patient tolerated treatment well. PIV removed. Discharged patient ambulatory in a stable condition and copy of AVS provided .

## 2025-06-20 DIAGNOSIS — D57.1 SICKLE CELL DISEASE WITHOUT CRISIS (MULTI): Primary | ICD-10-CM

## 2025-07-07 ENCOUNTER — OFFICE VISIT (OUTPATIENT)
Dept: HEMATOLOGY/ONCOLOGY | Facility: HOSPITAL | Age: 20
End: 2025-07-07
Payer: COMMERCIAL

## 2025-07-07 ENCOUNTER — APPOINTMENT (OUTPATIENT)
Dept: HEMATOLOGY/ONCOLOGY | Facility: HOSPITAL | Age: 20
End: 2025-07-07
Payer: COMMERCIAL

## 2025-07-07 ENCOUNTER — LAB (OUTPATIENT)
Dept: LAB | Facility: HOSPITAL | Age: 20
End: 2025-07-07
Payer: COMMERCIAL

## 2025-07-07 VITALS
TEMPERATURE: 97.9 F | OXYGEN SATURATION: 95 % | HEART RATE: 75 BPM | RESPIRATION RATE: 18 BRPM | WEIGHT: 126.32 LBS | SYSTOLIC BLOOD PRESSURE: 123 MMHG | BODY MASS INDEX: 20.16 KG/M2 | DIASTOLIC BLOOD PRESSURE: 66 MMHG

## 2025-07-07 DIAGNOSIS — Z51.89 ENCOUNTER FOR BLOOD TRANSFUSION: Primary | ICD-10-CM

## 2025-07-07 DIAGNOSIS — D57.1 SICKLE CELL DISEASE WITHOUT CRISIS (MULTI): ICD-10-CM

## 2025-07-07 DIAGNOSIS — D57.09 SICKLE CELL DISEASE WITH CRISIS AND OTHER COMPLICATION: ICD-10-CM

## 2025-07-07 DIAGNOSIS — E83.111 IRON OVERLOAD, TRANSFUSIONAL: ICD-10-CM

## 2025-07-07 LAB
ABO GROUP (TYPE) IN BLOOD: NORMAL
ALBUMIN SERPL BCP-MCNC: 4.5 G/DL (ref 3.4–5)
ALP SERPL-CCNC: 89 U/L (ref 33–120)
ALT SERPL W P-5'-P-CCNC: 58 U/L (ref 10–52)
ANION GAP SERPL CALC-SCNC: 12 MMOL/L (ref 10–20)
ANTIBODY SCREEN: NORMAL
AST SERPL W P-5'-P-CCNC: 50 U/L (ref 9–39)
BASOPHILS # BLD AUTO: 0.09 X10*3/UL (ref 0–0.1)
BASOPHILS NFR BLD AUTO: 0.6 %
BILIRUB SERPL-MCNC: 15 MG/DL (ref 0–1.2)
BUN SERPL-MCNC: 18 MG/DL (ref 6–23)
CA-I BLD-SCNC: 1.22 MMOL/L (ref 1.1–1.33)
CALCIUM SERPL-MCNC: 9.4 MG/DL (ref 8.6–10.3)
CHLORIDE SERPL-SCNC: 105 MMOL/L (ref 98–107)
CO2 SERPL-SCNC: 26 MMOL/L (ref 21–32)
CREAT SERPL-MCNC: 0.76 MG/DL (ref 0.5–1.3)
EGFRCR SERPLBLD CKD-EPI 2021: >90 ML/MIN/1.73M*2
EOSINOPHIL # BLD AUTO: 0.5 X10*3/UL (ref 0–0.7)
EOSINOPHIL NFR BLD AUTO: 3.4 %
ERYTHROCYTE [DISTWIDTH] IN BLOOD BY AUTOMATED COUNT: 20.2 % (ref 11.5–14.5)
FERRITIN SERPL-MCNC: 4660 NG/ML (ref 20–300)
GIANT PLATELETS BLD QL SMEAR: NORMAL
GLUCOSE SERPL-MCNC: 94 MG/DL (ref 74–99)
HCT VFR BLD AUTO: 24.8 % (ref 41–52)
HEMOGLOBIN A2: 2.5 % (ref 2–3.5)
HEMOGLOBIN A: 56.5 % (ref 95.8–98)
HEMOGLOBIN F: 0.8 % (ref 0–2)
HEMOGLOBIN IDENTIFICATION INTERPRETATION: ABNORMAL
HEMOGLOBIN S: 40.2 %
HGB BLD-MCNC: 8.5 G/DL (ref 13.5–17.5)
HGB RETIC QN: 35 PG (ref 28–38)
IMM GRANULOCYTES # BLD AUTO: 0.08 X10*3/UL (ref 0–0.7)
IMM GRANULOCYTES NFR BLD AUTO: 0.5 % (ref 0–0.9)
IMMATURE RETIC FRACTION: 35.9 %
LDH SERPL L TO P-CCNC: 269 U/L (ref 84–246)
LYMPHOCYTES # BLD AUTO: 5.16 X10*3/UL (ref 1.2–4.8)
LYMPHOCYTES NFR BLD AUTO: 34.8 %
MCH RBC QN AUTO: 30.5 PG (ref 26–34)
MCHC RBC AUTO-ENTMCNC: 34.3 G/DL (ref 32–36)
MCV RBC AUTO: 89 FL (ref 80–100)
MONOCYTES # BLD AUTO: 1.96 X10*3/UL (ref 0.1–1)
MONOCYTES NFR BLD AUTO: 13.2 %
NEUTROPHILS # BLD AUTO: 7.05 X10*3/UL (ref 1.2–7.7)
NEUTROPHILS NFR BLD AUTO: 47.5 %
NRBC BLD-RTO: 2.9 /100 WBCS (ref 0–0)
PAPPENHEIMER BOD BLD QL SMEAR: PRESENT
PATH REVIEW-HGB IDENTIFICATION: ABNORMAL
PLATELET # BLD AUTO: 245 X10*3/UL (ref 150–450)
PLATELET CLUMP BLD QL SMEAR: PRESENT
POLYCHROMASIA BLD QL SMEAR: NORMAL
POTASSIUM SERPL-SCNC: 3.7 MMOL/L (ref 3.5–5.3)
PROT SERPL-MCNC: 7.2 G/DL (ref 6.4–8.2)
RBC # BLD AUTO: 2.79 X10*6/UL (ref 4.5–5.9)
RBC MORPH BLD: NORMAL
RETICS #: 0.59 X10*6/UL (ref 0.02–0.12)
RETICS/RBC NFR AUTO: 21.2 % (ref 0.5–2)
RH FACTOR (ANTIGEN D): NORMAL
SCHISTOCYTES BLD QL SMEAR: NORMAL
SICKLE CELLS BLD QL SMEAR: NORMAL
SODIUM SERPL-SCNC: 139 MMOL/L (ref 136–145)
TARGETS BLD QL SMEAR: NORMAL
WBC # BLD AUTO: 14.8 X10*3/UL (ref 4.4–11.3)

## 2025-07-07 PROCEDURE — 86850 RBC ANTIBODY SCREEN: CPT

## 2025-07-07 PROCEDURE — 82728 ASSAY OF FERRITIN: CPT

## 2025-07-07 PROCEDURE — 99215 OFFICE O/P EST HI 40 MIN: CPT | Performed by: PEDIATRICS

## 2025-07-07 PROCEDURE — 80053 COMPREHEN METABOLIC PANEL: CPT

## 2025-07-07 PROCEDURE — 82330 ASSAY OF CALCIUM: CPT

## 2025-07-07 PROCEDURE — 85025 COMPLETE CBC W/AUTO DIFF WBC: CPT

## 2025-07-07 PROCEDURE — 85045 AUTOMATED RETICULOCYTE COUNT: CPT

## 2025-07-07 PROCEDURE — 83615 LACTATE (LD) (LDH) ENZYME: CPT

## 2025-07-07 PROCEDURE — 36415 COLL VENOUS BLD VENIPUNCTURE: CPT

## 2025-07-07 PROCEDURE — 86922 COMPATIBILITY TEST ANTIGLOB: CPT

## 2025-07-07 PROCEDURE — 83021 HEMOGLOBIN CHROMOTOGRAPHY: CPT

## 2025-07-07 RX ORDER — EPINEPHRINE 0.3 MG/.3ML
0.3 INJECTION SUBCUTANEOUS EVERY 5 MIN PRN
Status: CANCELLED | OUTPATIENT
Start: 2025-07-09

## 2025-07-07 RX ORDER — DIPHENHYDRAMINE HCL 25 MG
25 CAPSULE ORAL ONCE
Status: CANCELLED | OUTPATIENT
Start: 2025-07-09

## 2025-07-07 RX ORDER — ALBUTEROL SULFATE 0.83 MG/ML
3 SOLUTION RESPIRATORY (INHALATION) AS NEEDED
Status: CANCELLED | OUTPATIENT
Start: 2025-07-07

## 2025-07-07 RX ORDER — EPINEPHRINE 0.3 MG/.3ML
0.3 INJECTION SUBCUTANEOUS EVERY 5 MIN PRN
Status: CANCELLED | OUTPATIENT
Start: 2025-07-07

## 2025-07-07 RX ORDER — ACETAMINOPHEN 325 MG/1
650 TABLET ORAL ONCE
Status: CANCELLED | OUTPATIENT
Start: 2025-07-07

## 2025-07-07 RX ORDER — FAMOTIDINE 10 MG/ML
20 INJECTION, SOLUTION INTRAVENOUS ONCE AS NEEDED
Status: CANCELLED | OUTPATIENT
Start: 2025-07-07

## 2025-07-07 RX ORDER — PHENYLPROPANOLAMINE/CLEMASTINE 75-1.34MG
400 TABLET, EXTENDED RELEASE ORAL EVERY 6 HOURS PRN
Qty: 30 CAPSULE | Refills: 2 | Status: SHIPPED | OUTPATIENT
Start: 2025-07-07

## 2025-07-07 RX ORDER — FAMOTIDINE 10 MG/ML
20 INJECTION, SOLUTION INTRAVENOUS ONCE AS NEEDED
Status: CANCELLED | OUTPATIENT
Start: 2025-07-09

## 2025-07-07 RX ORDER — ACETAMINOPHEN 325 MG/1
650 TABLET ORAL ONCE
Status: CANCELLED | OUTPATIENT
Start: 2025-07-09

## 2025-07-07 RX ORDER — DEFERASIROX 360 MG/1
1440 TABLET, FILM COATED ORAL DAILY
COMMUNITY

## 2025-07-07 RX ORDER — DIPHENHYDRAMINE HCL 25 MG
25 CAPSULE ORAL ONCE
Status: CANCELLED | OUTPATIENT
Start: 2025-07-07

## 2025-07-07 RX ORDER — DIPHENHYDRAMINE HYDROCHLORIDE 50 MG/ML
50 INJECTION, SOLUTION INTRAMUSCULAR; INTRAVENOUS AS NEEDED
Status: CANCELLED | OUTPATIENT
Start: 2025-07-07

## 2025-07-07 RX ORDER — ALBUTEROL SULFATE 0.83 MG/ML
3 SOLUTION RESPIRATORY (INHALATION) AS NEEDED
Status: CANCELLED | OUTPATIENT
Start: 2025-07-09

## 2025-07-07 RX ORDER — DIPHENHYDRAMINE HYDROCHLORIDE 50 MG/ML
50 INJECTION, SOLUTION INTRAMUSCULAR; INTRAVENOUS AS NEEDED
Status: CANCELLED | OUTPATIENT
Start: 2025-07-09

## 2025-07-07 ASSESSMENT — PAIN SCALES - GENERAL: PAINLEVEL_OUTOF10: 0-NO PAIN

## 2025-07-08 RX ORDER — ALBUTEROL SULFATE 0.83 MG/ML
3 SOLUTION RESPIRATORY (INHALATION) AS NEEDED
OUTPATIENT
Start: 2025-07-09

## 2025-07-08 RX ORDER — DIPHENHYDRAMINE HYDROCHLORIDE 50 MG/ML
50 INJECTION, SOLUTION INTRAMUSCULAR; INTRAVENOUS AS NEEDED
OUTPATIENT
Start: 2025-07-09

## 2025-07-08 RX ORDER — ACETAMINOPHEN 325 MG/1
650 TABLET ORAL ONCE
OUTPATIENT
Start: 2025-07-09

## 2025-07-08 RX ORDER — FAMOTIDINE 10 MG/ML
20 INJECTION, SOLUTION INTRAVENOUS ONCE AS NEEDED
OUTPATIENT
Start: 2025-07-09

## 2025-07-08 RX ORDER — DIPHENHYDRAMINE HCL 25 MG
25 CAPSULE ORAL ONCE
OUTPATIENT
Start: 2025-07-09

## 2025-07-08 RX ORDER — EPINEPHRINE 0.3 MG/.3ML
0.3 INJECTION SUBCUTANEOUS EVERY 5 MIN PRN
OUTPATIENT
Start: 2025-07-09

## 2025-07-09 ENCOUNTER — INFUSION (OUTPATIENT)
Dept: HEMATOLOGY/ONCOLOGY | Facility: HOSPITAL | Age: 20
End: 2025-07-09
Payer: COMMERCIAL

## 2025-07-09 VITALS
WEIGHT: 126.3 LBS | HEART RATE: 68 BPM | DIASTOLIC BLOOD PRESSURE: 52 MMHG | BODY MASS INDEX: 20.15 KG/M2 | TEMPERATURE: 98.1 F | OXYGEN SATURATION: 97 % | RESPIRATION RATE: 18 BRPM | SYSTOLIC BLOOD PRESSURE: 102 MMHG

## 2025-07-09 DIAGNOSIS — D57.09 SICKLE CELL DISEASE WITH CRISIS AND OTHER COMPLICATION: ICD-10-CM

## 2025-07-09 DIAGNOSIS — Z51.89 ENCOUNTER FOR BLOOD TRANSFUSION: ICD-10-CM

## 2025-07-09 DIAGNOSIS — D57.1 SICKLE CELL DISEASE WITHOUT CRISIS (MULTI): ICD-10-CM

## 2025-07-09 LAB
BLOOD EXPIRATION DATE: NORMAL
BLOOD EXPIRATION DATE: NORMAL
DISPENSE STATUS: NORMAL
DISPENSE STATUS: NORMAL
PRODUCT BLOOD TYPE: 9500
PRODUCT BLOOD TYPE: 9500
PRODUCT CODE: NORMAL
PRODUCT CODE: NORMAL
UNIT ABO: NORMAL
UNIT ABO: NORMAL
UNIT NUMBER: NORMAL
UNIT NUMBER: NORMAL
UNIT RH: NORMAL
UNIT RH: NORMAL
UNIT VOLUME: 275
UNIT VOLUME: 291
XM INTEP: NORMAL
XM INTEP: NORMAL

## 2025-07-09 PROCEDURE — 99195 PHLEBOTOMY: CPT

## 2025-07-09 PROCEDURE — 86902 BLOOD TYPE ANTIGEN DONOR EA: CPT

## 2025-07-09 PROCEDURE — P9040 RBC LEUKOREDUCED IRRADIATED: HCPCS

## 2025-07-09 PROCEDURE — 36430 TRANSFUSION BLD/BLD COMPNT: CPT

## 2025-07-09 PROCEDURE — 2500000001 HC RX 250 WO HCPCS SELF ADMINISTERED DRUGS (ALT 637 FOR MEDICARE OP): Mod: SE | Performed by: PEDIATRICS

## 2025-07-09 RX ORDER — DIPHENHYDRAMINE HCL 25 MG
25 CAPSULE ORAL ONCE
Status: COMPLETED | OUTPATIENT
Start: 2025-07-09 | End: 2025-07-09

## 2025-07-09 RX ORDER — FAMOTIDINE 10 MG/ML
20 INJECTION, SOLUTION INTRAVENOUS ONCE AS NEEDED
Status: DISCONTINUED | OUTPATIENT
Start: 2025-07-09 | End: 2025-07-09 | Stop reason: HOSPADM

## 2025-07-09 RX ORDER — ACETAMINOPHEN 325 MG/1
650 TABLET ORAL ONCE
Status: COMPLETED | OUTPATIENT
Start: 2025-07-09 | End: 2025-07-09

## 2025-07-09 RX ORDER — ALBUTEROL SULFATE 0.83 MG/ML
3 SOLUTION RESPIRATORY (INHALATION) AS NEEDED
Status: DISCONTINUED | OUTPATIENT
Start: 2025-07-09 | End: 2025-07-09 | Stop reason: HOSPADM

## 2025-07-09 RX ORDER — DIPHENHYDRAMINE HYDROCHLORIDE 50 MG/ML
50 INJECTION, SOLUTION INTRAMUSCULAR; INTRAVENOUS AS NEEDED
Status: DISCONTINUED | OUTPATIENT
Start: 2025-07-09 | End: 2025-07-09 | Stop reason: HOSPADM

## 2025-07-09 RX ORDER — EPINEPHRINE 0.3 MG/.3ML
0.3 INJECTION SUBCUTANEOUS EVERY 5 MIN PRN
Status: DISCONTINUED | OUTPATIENT
Start: 2025-07-09 | End: 2025-07-09 | Stop reason: HOSPADM

## 2025-07-09 RX ADMIN — DIPHENHYDRAMINE HYDROCHLORIDE 25 MG: 25 CAPSULE ORAL at 08:30

## 2025-07-09 RX ADMIN — ACETAMINOPHEN 650 MG: 325 TABLET ORAL at 08:30

## 2025-07-09 ASSESSMENT — PAIN SCALES - GENERAL: PAINLEVEL_OUTOF10: 0-NO PAIN

## 2025-07-18 DIAGNOSIS — D57.1 SICKLE CELL DISEASE WITHOUT CRISIS (MULTI): Primary | ICD-10-CM

## 2025-07-18 ASSESSMENT — ENCOUNTER SYMPTOMS
BACK PAIN: 0
FATIGUE: 0
ABDOMINAL PAIN: 0
ARTHRALGIAS: 0
EYE PROBLEMS: 0
NUMBNESS: 0
COUGH: 0
EXTREMITY WEAKNESS: 0
FEVER: 0
PALPITATIONS: 0
FLANK PAIN: 0
HEMATURIA: 0
FREQUENCY: 0
WOUND: 0
SCLERAL ICTERUS: 1
LEG SWELLING: 0
DYSURIA: 0
DIZZINESS: 0
HEADACHES: 0
CONSTIPATION: 0
DIARRHEA: 0
SORE THROAT: 0
NAUSEA: 0
DEPRESSION: 0
SHORTNESS OF BREATH: 0
CHEST TIGHTNESS: 0
MYALGIAS: 0
LIGHT-HEADEDNESS: 0
BRUISES/BLEEDS EASILY: 0
VOMITING: 0
WHEEZING: 0
NERVOUS/ANXIOUS: 0
HEMOPTYSIS: 0

## 2025-07-18 NOTE — PROGRESS NOTES
SICKLE CELL OUTPATIENT NOTE  Patient ID: Lana Boss   Visit Type: Follow up visit     ASSESSMENT AND PLAN  Lana Boss is 19 y.o. male with     History of Hb SS sickle cell, in steady state, and without acute complications of sickle cell disease including sickle cell pain  Encounter for management of disease modifying therapy.  He is on chronic red cell exchange transfusions every 4 weeks,, indications being history of stroke in 2018, and CNS vasculopathy.  He is also on oral hydroxyurea, and partially compliant, and missing about 3 to 4 days a week.  Iron overload secondary to chronic blood transfusion.  He is on chelation therapy, with oral Jadenu, and also intermittently compliant missing about 3 days a week.  His ferritin level is 4660.  Mildly elevated transaminases, and significantly elevated total bilirubin of 15, mainly an indirect hyperbilirubinemia.  He has a direct total bilirubin of 1.2.  This is secondary to his underlying hemolytic phenotype of sickle cell disease.  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  Chronic anemia, with hemoglobin of 8.5 g/dL.  This is around his new baseline with exchange transfusions and hydroxyurea.      Plan  - Scheduled for manual red blood cell exchange transfusion today and will proceed as planned.  We will phlebotomize 1 unit of whole blood, and transfuse 2 units of packed red blood cells.  Target hemoglobin S of less than 30 to 40% with a posttransfusion hemoglobin of about 9 to 10 g/dL.  - Continue oral hydroxyurea 1500 mg daily.  Emphasized importance of medication compliance  - Continue daily folic acid 1 mg  - Continue amoxicillin as antimicrobial prophylaxis for asplenia.  - Reviewed risk of iron overload, and discussed importance of being compliant with chelation therapy.  We will continue Jadenu 1440 mg daily.  Also discussed addition of parenteral chelation therapy to current  regimen.  We will hold off this for now and to patient is very compliant with current regiment.  - No changes in his current home oral pain regimen with   Oral Tylenol 650 mg every 6 hours as needed and or ibuprofen 600 mg every 8 hours  or naprosyn 500 mg BID as needed for mild to moderate pain.    If above does not control his pain he will call for oral morphine or oxycodone. Of note, he is opioid naive and does not have any record in [facility]   Nonpharmacologic methods of pain control  -Follow up will be in 4 weeks time for his next blood cell exchange transfusion    Chief Complaint: HB SS SCD on chronic manual red cell transfusion      Interval History: Lana is present with his mother in clinic today and they are without any complaints.  He is overall doing well and denies acute pain in clinic today or since his last office visit encounter.  He has not had any hospitalizations or ED visits or urgent care visits since his last office visit encounter.  He remains on hydroxyurea but has not been compliant and misses multiple days during the week.  He is also on oral Jadenu and also misses multiple days as well.  He denies any side effects of hydroxyurea and Jadenu at this point  He denies headaches, dizziness, focal neurologic deficits, priapism, blurry vision, double vision, nausea, or vomiting. He denies swelling or leg ulcers.     Review of System:   Review of Systems   Constitutional:  Negative for fatigue and fever.   HENT:   Negative for nosebleeds, sore throat and tinnitus.    Eyes:  Positive for icterus. Negative for eye problems.   Respiratory:  Negative for chest tightness, cough, hemoptysis, shortness of breath and wheezing.    Cardiovascular:  Negative for chest pain, leg swelling and palpitations.   Gastrointestinal:  Negative for abdominal pain, constipation, diarrhea, nausea and vomiting.   Genitourinary:  Negative for dysuria, frequency and hematuria.    Musculoskeletal:  Negative for arthralgias,  back pain, flank pain and myalgias.   Skin:  Negative for itching, rash and wound.   Neurological:  Negative for dizziness, extremity weakness, headaches, light-headedness and numbness.   Hematological:  Does not bruise/bleed easily.   Psychiatric/Behavioral:  Negative for depression. The patient is not nervous/anxious.       Allergies:   Allergies   Allergen Reactions    Kiwi Unknown    Red Blood Cells Unknown     Require medication for blood products- tylenol only     Current Medications:   Medication Documentation Review Audit       Reviewed by Brett Murray MD (Physician) on 07/07/25 at 0856      Medication Order Taking? Sig Documenting Provider Last Dose Status   acetaminophen (Tylenol) 325 mg tablet [id number] Yes Take 2 tablets (650 mg) by mouth every 6 hours if needed for mild pain (1 - 3) (Note:  please check temperature 1st and do not take medicine if temperature is 101 F or greater - call sickle cell doctor.). NIKKI Stephenson  Active   amoxicillin (Amoxil) 250 mg capsule [id number] Yes Take 1 capsule (250 mg) by mouth every 12 hours. NIKKI Stephenson  Active   deferasirox (Jadenu) 360 mg tablet [id number] Yes Take 4 tablets (1,440 mg) by mouth once daily. Historical Provider, MD  Active   hydroxyurea (Hydrea) 500 mg capsule [id number] Yes Take 3 capsules (1,500 mg total) by mouth once daily.  Take at the same time  from Monday to Fridays only. No med on Saturdays and Sundays Brett Murray MD  Active   ibuprofen (Motrin) capsule [id number] Yes Take 2 capsules (400 mg) by mouth every 6 hours if needed (for pain or headache; check temperature 1st and do not take if fever 101 F or higher). Shreya Amezcua MD  Active   naproxen (Naprosyn) 375 mg tablet [id number] Yes Do not give if temperature is at or above 101 degrees fahrenheit NIKKI Stephenson  Active   Tab-A-Emre 400 mcg tablet [id number] Yes Take 1 tablet by mouth once daily. NIKKI Stephenson  Active                  Past  Medical/Surgical History:   - Dactylitis in June 2006   - Multiple Splenic sequestration in July 2006, September 2006, requiring transfusion   - Stroke in September 2008 and was begun on chronic transfusions at that time for secondary stroke prevention.  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 He uses smokeless tobacco.  has no history on file for drug use. Lives with his parents. He graduated high school and remains unemployed. He is thinking about going to trade school      EXAMINATION FINDINGS   /66 (BP Location: Right arm, Patient Position: Sitting, BP Cuff Size: Adult)   Pulse 75   Temp 36.6 °C (97.9 °F) (Skin)   Resp 18   Wt 57.3 kg (126 lb 5.2 oz)   SpO2 95%   BMI 20.16 kg/m²   1.64 meters squared    Physical Exam  Vitals reviewed.   Constitutional:       General: He is not in acute distress.     Appearance: He is normal weight. He is not ill-appearing.   HENT:      Head: Normocephalic and atraumatic.      Right Ear: Tympanic membrane and ear canal normal.      Left Ear: Tympanic membrane and ear canal normal.      Nose: Nose normal.      Mouth/Throat:      Mouth: Mucous membranes are moist.      Pharynx: Oropharynx is clear. No oropharyngeal exudate or posterior oropharyngeal erythema.     Eyes:      General: Scleral icterus present.       Cardiovascular:      Rate and Rhythm: Normal rate and regular rhythm.      Pulses: Normal pulses.      Heart sounds: Murmur heard.      No gallop.   Pulmonary:      Effort: Pulmonary effort is normal. No respiratory distress.      Breath sounds: Normal  breath sounds. No wheezing or rales.   Abdominal:      General: Bowel sounds are normal. There is no distension.      Palpations: Abdomen is soft.      Tenderness: There is no abdominal tenderness. There is no guarding.     Musculoskeletal:         General: No swelling or deformity. Normal range of motion.      Cervical back: Normal range of motion and neck supple. No rigidity.      Right lower leg: No edema.      Left lower leg: No edema.     Skin:     General: Skin is warm.      Capillary Refill: Capillary refill takes less than 2 seconds.      Coloration: Skin is not jaundiced.      Findings: No bruising.     Neurological:      General: No focal deficit present.      Mental Status: He is alert and oriented to person, place, and time. Mental status is at baseline.      Cranial Nerves: No cranial nerve deficit.     Psychiatric:         Mood and Affect: Mood normal.         Behavior: Behavior normal.     LABS   Latest Reference Range & Units 07/07/25 09:25   ANTIBODY SCREEN  NEG   ABO TYPE  O   Rh Type  NEG   GLUCOSE 74 - 99 mg/dL 94   SODIUM 136 - 145 mmol/L 139   POTASSIUM 3.5 - 5.3 mmol/L 3.7   CHLORIDE 98 - 107 mmol/L 105   Bicarbonate 21 - 32 mmol/L 26   Anion Gap 10 - 20 mmol/L 12   Blood Urea Nitrogen 6 - 23 mg/dL 18   Creatinine 0.50 - 1.30 mg/dL 0.76   EGFR >60 mL/min/1.73m*2 >90   Calcium 8.6 - 10.3 mg/dL 9.4   Albumin 3.4 - 5.0 g/dL 4.5   Alkaline Phosphatase 33 - 120 U/L 89   ALT 10 - 52 U/L 58 (H)   AST 9 - 39 U/L 50 (H)   Bilirubin Total 0.0 - 1.2 mg/dL 15.0 (H)   FERRITIN 20 - 300 ng/mL 4,660 (H)   Total Protein 6.4 - 8.2 g/dL 7.2   LDH 84 - 246 U/L 269 (H)   WBC 4.4 - 11.3 x10*3/uL 14.8 (H)   nRBC 0.0 - 0.0 /100 WBCs 2.9 (H)   RBC 4.50 - 5.90 x10*6/uL 2.79 (L)   HEMOGLOBIN 13.5 - 17.5 g/dL 8.5 (L)   HEMATOCRIT 41.0 - 52.0 % 24.8 (L)   MCV 80 - 100 fL 89   MCH 26.0 - 34.0 pg 30.5   MCHC 32.0 - 36.0 g/dL 34.3   RED CELL DISTRIBUTION WIDTH 11.5 - 14.5 % 20.2 (H)   Platelets 150 - 450 x10*3/uL 245    Neutrophils % 40.0 - 80.0 % 47.5   Immature Granulocytes %, Automated 0.0 - 0.9 % 0.5   Lymphocytes % 13.0 - 44.0 % 34.8   Monocytes % 2.0 - 10.0 % 13.2   Eosinophils % 0.0 - 6.0 % 3.4   Basophils % 0.0 - 2.0 % 0.6   Neutrophils Absolute 1.20 - 7.70 x10*3/uL 7.05   Immature Granulocytes Absolute, Automated 0.00 - 0.70 x10*3/uL 0.08   Lymphocytes Absolute 1.20 - 4.80 x10*3/uL 5.16 (H)   Monocytes Absolute 0.10 - 1.00 x10*3/uL 1.96 (H)   Eosinophils Absolute 0.00 - 0.70 x10*3/uL 0.50   Basophils Absolute 0.00 - 0.10 x10*3/uL 0.09   RBC Morphology  See Below   Polychromasia  Mild   RBC Fragments  Few   Sickle Cells  Few   Target Cells  Few   Pappenheimer Bodies  Present   Giant Platelets  Few   Clumped Platelets  Present   Retic % 0.5 - 2.0 % 21.2 (H)   Retic Absolute 0.022 - 0.118 x10*6/uL 0.592 (H)   Reticulocyte Hemoglobin 28 - 38 pg 35   Immature Retic fraction <=16.0 % 35.9 (H)   Hemoglobin A 95.8 - 98.0 % 56.5 (L)   Hemoglobin F 0.0 - 2.0 % 0.8   Hemoglobin S <=0.0 % 40.2 (H)   Hemoglobin A2 2.0 - 3.5 % 2.5   Hemoglobin Identification Interpretation Normal  See Below !   POCT Calcium, Ionized 1.1 - 1.33 mmol/L 1.22          Brett Murray MD

## 2025-08-04 ENCOUNTER — APPOINTMENT (OUTPATIENT)
Dept: HEMATOLOGY/ONCOLOGY | Facility: HOSPITAL | Age: 20
End: 2025-08-04
Payer: COMMERCIAL

## 2025-08-21 DIAGNOSIS — D57.00 SICKLE CELL DISEASE WITH CRISIS (MULTI): Primary | ICD-10-CM

## 2025-08-21 DIAGNOSIS — Z92.89 HISTORY OF EXCHANGE TRANSFUSION: ICD-10-CM

## 2025-09-02 ENCOUNTER — LAB (OUTPATIENT)
Dept: LAB | Facility: HOSPITAL | Age: 20
End: 2025-09-02
Payer: COMMERCIAL

## 2025-09-02 DIAGNOSIS — D57.00 SICKLE CELL DISEASE WITH CRISIS (MULTI): ICD-10-CM

## 2025-09-02 DIAGNOSIS — Z92.89 HISTORY OF EXCHANGE TRANSFUSION: ICD-10-CM

## 2025-09-02 LAB
ABO GROUP (TYPE) IN BLOOD: NORMAL
ALBUMIN SERPL BCP-MCNC: 4.3 G/DL (ref 3.4–5)
ALP SERPL-CCNC: 73 U/L (ref 33–120)
ALT SERPL W P-5'-P-CCNC: 58 U/L (ref 10–52)
ANION GAP SERPL CALC-SCNC: 10 MMOL/L (ref 10–20)
ANTIBODY SCREEN: NORMAL
AST SERPL W P-5'-P-CCNC: 52 U/L (ref 9–39)
BASOPHILS # BLD AUTO: 0.09 X10*3/UL (ref 0–0.1)
BASOPHILS NFR BLD AUTO: 0.8 %
BILIRUB SERPL-MCNC: 11.9 MG/DL (ref 0–1.2)
BUN SERPL-MCNC: 12 MG/DL (ref 6–23)
CALCIUM SERPL-MCNC: 9.2 MG/DL (ref 8.6–10.3)
CHLORIDE SERPL-SCNC: 107 MMOL/L (ref 98–107)
CO2 SERPL-SCNC: 28 MMOL/L (ref 21–32)
CREAT SERPL-MCNC: 0.72 MG/DL (ref 0.5–1.3)
EGFRCR SERPLBLD CKD-EPI 2021: >90 ML/MIN/1.73M*2
EOSINOPHIL # BLD AUTO: 0.31 X10*3/UL (ref 0–0.7)
EOSINOPHIL NFR BLD AUTO: 2.7 %
ERYTHROCYTE [DISTWIDTH] IN BLOOD BY AUTOMATED COUNT: 23.7 % (ref 11.5–14.5)
FERRITIN SERPL-MCNC: 4420 NG/ML (ref 20–300)
GLUCOSE SERPL-MCNC: 94 MG/DL (ref 74–99)
HCT VFR BLD AUTO: 22 % (ref 41–52)
HGB BLD-MCNC: 7.7 G/DL (ref 13.5–17.5)
HGB RETIC QN: 36 PG (ref 28–38)
IMM GRANULOCYTES # BLD AUTO: 0.06 X10*3/UL (ref 0–0.7)
IMM GRANULOCYTES NFR BLD AUTO: 0.5 % (ref 0–0.9)
IMMATURE RETIC FRACTION: 32.3 %
LDH SERPL L TO P-CCNC: 295 U/L (ref 84–246)
LYMPHOCYTES # BLD AUTO: 3.52 X10*3/UL (ref 1.2–4.8)
LYMPHOCYTES NFR BLD AUTO: 30.1 %
MCH RBC QN AUTO: 31.8 PG (ref 26–34)
MCHC RBC AUTO-ENTMCNC: 35 G/DL (ref 32–36)
MCV RBC AUTO: 91 FL (ref 80–100)
MONOCYTES # BLD AUTO: 1.48 X10*3/UL (ref 0.1–1)
MONOCYTES NFR BLD AUTO: 12.7 %
NEUTROPHILS # BLD AUTO: 6.22 X10*3/UL (ref 1.2–7.7)
NEUTROPHILS NFR BLD AUTO: 53.2 %
NRBC BLD-RTO: 9.7 /100 WBCS (ref 0–0)
PLATELET # BLD AUTO: 232 X10*3/UL (ref 150–450)
POTASSIUM SERPL-SCNC: 4.2 MMOL/L (ref 3.5–5.3)
PROT SERPL-MCNC: 6.9 G/DL (ref 6.4–8.2)
RBC # BLD AUTO: 2.42 X10*6/UL (ref 4.5–5.9)
RETICS #: 0.81 X10*6/UL (ref 0.02–0.12)
RETICS/RBC NFR AUTO: 33.6 % (ref 0.5–2)
RH FACTOR (ANTIGEN D): NORMAL
SODIUM SERPL-SCNC: 141 MMOL/L (ref 136–145)
WBC # BLD AUTO: 11.7 X10*3/UL (ref 4.4–11.3)

## 2025-09-02 PROCEDURE — 86900 BLOOD TYPING SEROLOGIC ABO: CPT

## 2025-09-02 PROCEDURE — 83615 LACTATE (LD) (LDH) ENZYME: CPT

## 2025-09-02 PROCEDURE — 83021 HEMOGLOBIN CHROMOTOGRAPHY: CPT

## 2025-09-02 PROCEDURE — 85025 COMPLETE CBC W/AUTO DIFF WBC: CPT

## 2025-09-02 PROCEDURE — 80053 COMPREHEN METABOLIC PANEL: CPT

## 2025-09-02 PROCEDURE — 82728 ASSAY OF FERRITIN: CPT

## 2025-09-02 PROCEDURE — 36415 COLL VENOUS BLD VENIPUNCTURE: CPT

## 2025-09-02 PROCEDURE — 85045 AUTOMATED RETICULOCYTE COUNT: CPT

## 2025-09-02 PROCEDURE — 86920 COMPATIBILITY TEST SPIN: CPT

## 2025-09-02 RX ORDER — DIPHENHYDRAMINE HYDROCHLORIDE 50 MG/ML
50 INJECTION, SOLUTION INTRAMUSCULAR; INTRAVENOUS AS NEEDED
OUTPATIENT
Start: 2025-09-03

## 2025-09-02 RX ORDER — EPINEPHRINE 0.3 MG/.3ML
0.3 INJECTION SUBCUTANEOUS EVERY 5 MIN PRN
OUTPATIENT
Start: 2025-09-03

## 2025-09-02 RX ORDER — FAMOTIDINE 10 MG/ML
20 INJECTION, SOLUTION INTRAVENOUS ONCE AS NEEDED
OUTPATIENT
Start: 2025-09-03

## 2025-09-02 RX ORDER — DIPHENHYDRAMINE HCL 25 MG
25 CAPSULE ORAL ONCE
OUTPATIENT
Start: 2025-09-03

## 2025-09-02 RX ORDER — ACETAMINOPHEN 325 MG/1
650 TABLET ORAL ONCE
OUTPATIENT
Start: 2025-09-03

## 2025-09-02 RX ORDER — ALBUTEROL SULFATE 0.83 MG/ML
3 SOLUTION RESPIRATORY (INHALATION) AS NEEDED
OUTPATIENT
Start: 2025-09-03

## 2025-09-03 ENCOUNTER — INFUSION (OUTPATIENT)
Dept: HEMATOLOGY/ONCOLOGY | Facility: HOSPITAL | Age: 20
End: 2025-09-03
Payer: COMMERCIAL

## 2025-09-03 ENCOUNTER — OFFICE VISIT (OUTPATIENT)
Dept: HEMATOLOGY/ONCOLOGY | Facility: HOSPITAL | Age: 20
End: 2025-09-03
Payer: COMMERCIAL

## 2025-09-03 VITALS
RESPIRATION RATE: 15 BRPM | BODY MASS INDEX: 20.89 KG/M2 | OXYGEN SATURATION: 95 % | TEMPERATURE: 98.2 F | DIASTOLIC BLOOD PRESSURE: 70 MMHG | HEART RATE: 80 BPM | WEIGHT: 130.9 LBS | SYSTOLIC BLOOD PRESSURE: 136 MMHG

## 2025-09-03 VITALS
OXYGEN SATURATION: 98 % | HEART RATE: 79 BPM | RESPIRATION RATE: 19 BRPM | TEMPERATURE: 98.4 F | SYSTOLIC BLOOD PRESSURE: 105 MMHG | DIASTOLIC BLOOD PRESSURE: 50 MMHG

## 2025-09-03 DIAGNOSIS — Z79.64 ENCOUNTER FOR MONITORING OF HYDROXYUREA THERAPY: ICD-10-CM

## 2025-09-03 DIAGNOSIS — D57.09 SICKLE CELL DISEASE WITH CRISIS AND OTHER COMPLICATION: ICD-10-CM

## 2025-09-03 DIAGNOSIS — Z51.81 ENCOUNTER FOR MONITORING OF HYDROXYUREA THERAPY: ICD-10-CM

## 2025-09-03 DIAGNOSIS — D57.1 SICKLE CELL DISEASE WITHOUT CRISIS (MULTI): ICD-10-CM

## 2025-09-03 DIAGNOSIS — Z51.89 ENCOUNTER FOR BLOOD TRANSFUSION: ICD-10-CM

## 2025-09-03 LAB
BLOOD EXPIRATION DATE: NORMAL
BLOOD EXPIRATION DATE: NORMAL
DISPENSE STATUS: NORMAL
DISPENSE STATUS: NORMAL
ERYTHROCYTE [DISTWIDTH] IN BLOOD BY AUTOMATED COUNT: 21 % (ref 11.5–14.5)
HCT VFR BLD AUTO: 25.8 % (ref 41–52)
HGB BLD-MCNC: 9.1 G/DL (ref 13.5–17.5)
MCH RBC QN AUTO: 31.8 PG (ref 26–34)
MCHC RBC AUTO-ENTMCNC: 35.3 G/DL (ref 32–36)
MCV RBC AUTO: 90 FL (ref 80–100)
NRBC BLD-RTO: 4.8 /100 WBCS (ref 0–0)
PLATELET # BLD AUTO: 249 X10*3/UL (ref 150–450)
PRODUCT BLOOD TYPE: 9500
PRODUCT BLOOD TYPE: 9500
PRODUCT CODE: NORMAL
PRODUCT CODE: NORMAL
RBC # BLD AUTO: 2.86 X10*6/UL (ref 4.5–5.9)
UNIT ABO: NORMAL
UNIT ABO: NORMAL
UNIT NUMBER: NORMAL
UNIT NUMBER: NORMAL
UNIT RH: NORMAL
UNIT RH: NORMAL
UNIT VOLUME: 281
UNIT VOLUME: 350
WBC # BLD AUTO: 15.8 X10*3/UL (ref 4.4–11.3)
XM INTEP: NORMAL
XM INTEP: NORMAL

## 2025-09-03 PROCEDURE — P9040 RBC LEUKOREDUCED IRRADIATED: HCPCS

## 2025-09-03 PROCEDURE — 85027 COMPLETE CBC AUTOMATED: CPT

## 2025-09-03 PROCEDURE — 99214 OFFICE O/P EST MOD 30 MIN: CPT | Performed by: NURSE PRACTITIONER

## 2025-09-03 PROCEDURE — 83021 HEMOGLOBIN CHROMOTOGRAPHY: CPT

## 2025-09-03 PROCEDURE — 99195 PHLEBOTOMY: CPT

## 2025-09-03 PROCEDURE — 99214 OFFICE O/P EST MOD 30 MIN: CPT | Mod: 25 | Performed by: NURSE PRACTITIONER

## 2025-09-03 PROCEDURE — 2500000001 HC RX 250 WO HCPCS SELF ADMINISTERED DRUGS (ALT 637 FOR MEDICARE OP): Mod: SE | Performed by: PEDIATRICS

## 2025-09-03 PROCEDURE — 36430 TRANSFUSION BLD/BLD COMPNT: CPT

## 2025-09-03 PROCEDURE — 4004F PT TOBACCO SCREEN RCVD TLK: CPT | Performed by: NURSE PRACTITIONER

## 2025-09-03 RX ORDER — ACETAMINOPHEN 325 MG/1
650 TABLET ORAL ONCE
Status: COMPLETED | OUTPATIENT
Start: 2025-09-03 | End: 2025-09-03

## 2025-09-03 RX ORDER — HYDROXYUREA 500 MG/1
1500 CAPSULE ORAL DAILY
Qty: 90 CAPSULE | Refills: 1 | Status: SHIPPED | OUTPATIENT
Start: 2025-09-03

## 2025-09-03 RX ORDER — DIPHENHYDRAMINE HYDROCHLORIDE 50 MG/ML
50 INJECTION, SOLUTION INTRAMUSCULAR; INTRAVENOUS AS NEEDED
Status: DISCONTINUED | OUTPATIENT
Start: 2025-09-03 | End: 2025-09-03 | Stop reason: HOSPADM

## 2025-09-03 RX ORDER — ALBUTEROL SULFATE 0.83 MG/ML
3 SOLUTION RESPIRATORY (INHALATION) AS NEEDED
Status: DISCONTINUED | OUTPATIENT
Start: 2025-09-03 | End: 2025-09-03 | Stop reason: HOSPADM

## 2025-09-03 RX ORDER — DIPHENHYDRAMINE HCL 25 MG
25 CAPSULE ORAL ONCE
Status: COMPLETED | OUTPATIENT
Start: 2025-09-03 | End: 2025-09-03

## 2025-09-03 RX ORDER — FAMOTIDINE 10 MG/ML
20 INJECTION, SOLUTION INTRAVENOUS ONCE AS NEEDED
Status: DISCONTINUED | OUTPATIENT
Start: 2025-09-03 | End: 2025-09-03 | Stop reason: HOSPADM

## 2025-09-03 RX ORDER — MULTIVITAMIN WITH FOLIC ACID 400 MCG
1 TABLET ORAL DAILY
Qty: 30 TABLET | Refills: 5 | Status: SHIPPED | OUTPATIENT
Start: 2025-09-03

## 2025-09-03 RX ORDER — DEFERASIROX 360 MG/1
1440 TABLET, FILM COATED ORAL DAILY
Qty: 120 TABLET | Refills: 1 | Status: SHIPPED | OUTPATIENT
Start: 2025-09-03

## 2025-09-03 RX ORDER — EPINEPHRINE 0.3 MG/.3ML
0.3 INJECTION SUBCUTANEOUS EVERY 5 MIN PRN
Status: DISCONTINUED | OUTPATIENT
Start: 2025-09-03 | End: 2025-09-03 | Stop reason: HOSPADM

## 2025-09-03 RX ADMIN — DIPHENHYDRAMINE HYDROCHLORIDE 25 MG: 25 CAPSULE ORAL at 10:07

## 2025-09-03 RX ADMIN — ACETAMINOPHEN 650 MG: 325 TABLET ORAL at 10:07

## 2025-09-03 ASSESSMENT — PAIN SCALES - GENERAL: PAINLEVEL_OUTOF10: 0-NO PAIN

## 2025-09-04 LAB
HEMOGLOBIN A2: 3.1 % (ref 2–3.5)
HEMOGLOBIN A: 26 % (ref 95.8–98)
HEMOGLOBIN F: 0.9 % (ref 0–2)
HEMOGLOBIN IDENTIFICATION INTERPRETATION: ABNORMAL
HEMOGLOBIN S: 70 %
PATH REVIEW-HGB IDENTIFICATION: ABNORMAL

## 2025-09-05 LAB
HEMOGLOBIN A2: 3 % (ref 2–3.5)
HEMOGLOBIN A: 52.1 % (ref 95.8–98)
HEMOGLOBIN F: 0.9 % (ref 0–2)
HEMOGLOBIN IDENTIFICATION INTERPRETATION: ABNORMAL
HEMOGLOBIN S: 44 %
PATH REVIEW-HGB IDENTIFICATION: ABNORMAL